# Patient Record
Sex: MALE | Race: WHITE | NOT HISPANIC OR LATINO | Employment: OTHER | URBAN - METROPOLITAN AREA
[De-identification: names, ages, dates, MRNs, and addresses within clinical notes are randomized per-mention and may not be internally consistent; named-entity substitution may affect disease eponyms.]

---

## 2017-03-28 ENCOUNTER — OFFICE VISIT (OUTPATIENT)
Dept: URGENT CARE | Age: 37
End: 2017-03-28
Payer: COMMERCIAL

## 2017-03-28 PROCEDURE — G0382 LEV 3 HOSP TYPE B ED VISIT: HCPCS | Performed by: FAMILY MEDICINE

## 2017-06-05 ENCOUNTER — OFFICE VISIT (OUTPATIENT)
Dept: URGENT CARE | Age: 37
End: 2017-06-05
Payer: MEDICARE

## 2017-06-05 PROCEDURE — G0463 HOSPITAL OUTPT CLINIC VISIT: HCPCS

## 2017-06-05 PROCEDURE — 99213 OFFICE O/P EST LOW 20 MIN: CPT

## 2018-05-29 ENCOUNTER — APPOINTMENT (EMERGENCY)
Dept: RADIOLOGY | Facility: HOSPITAL | Age: 38
End: 2018-05-29
Payer: MEDICARE

## 2018-05-29 ENCOUNTER — HOSPITAL ENCOUNTER (EMERGENCY)
Facility: HOSPITAL | Age: 38
Discharge: HOME | End: 2018-05-29
Attending: EMERGENCY MEDICINE | Admitting: EMERGENCY MEDICINE
Payer: MEDICARE

## 2018-05-29 VITALS
WEIGHT: 165 LBS | HEART RATE: 58 BPM | DIASTOLIC BLOOD PRESSURE: 73 MMHG | RESPIRATION RATE: 15 BRPM | OXYGEN SATURATION: 95 % | TEMPERATURE: 97.2 F | SYSTOLIC BLOOD PRESSURE: 106 MMHG | BODY MASS INDEX: 25.84 KG/M2

## 2018-05-29 DIAGNOSIS — R41.89 UNRESPONSIVE EPISODE: Primary | ICD-10-CM

## 2018-05-29 DIAGNOSIS — F79 MENTAL RETARDATION: ICD-10-CM

## 2018-05-29 LAB
ALBUMIN SERPL BCP-MCNC: 3.4 G/DL (ref 3.5–5)
ALP SERPL-CCNC: 67 U/L (ref 46–116)
ALT SERPL W P-5'-P-CCNC: 17 U/L (ref 12–78)
ANION GAP SERPL CALCULATED.3IONS-SCNC: 2 MMOL/L (ref 4–13)
AST SERPL W P-5'-P-CCNC: 20 U/L (ref 5–45)
ATRIAL RATE: 65 BPM
BASOPHILS # BLD AUTO: 0.01 THOUSANDS/ΜL (ref 0–0.1)
BASOPHILS NFR BLD AUTO: 0 % (ref 0–1)
BILIRUB SERPL-MCNC: 0.31 MG/DL (ref 0.2–1)
BUN SERPL-MCNC: 22 MG/DL (ref 5–25)
CALCIUM SERPL-MCNC: 8.8 MG/DL (ref 8.3–10.1)
CHLORIDE SERPL-SCNC: 106 MMOL/L (ref 100–108)
CO2 SERPL-SCNC: 33 MMOL/L (ref 21–32)
CREAT SERPL-MCNC: 1.01 MG/DL (ref 0.6–1.3)
EOSINOPHIL # BLD AUTO: 0.05 THOUSAND/ΜL (ref 0–0.61)
EOSINOPHIL NFR BLD AUTO: 1 % (ref 0–6)
ERYTHROCYTE [DISTWIDTH] IN BLOOD BY AUTOMATED COUNT: 13.4 % (ref 11.6–15.1)
GFR SERPL CREATININE-BSD FRML MDRD: 95 ML/MIN/1.73SQ M
GLUCOSE SERPL-MCNC: 77 MG/DL (ref 65–140)
HCT VFR BLD AUTO: 44.7 % (ref 36.5–49.3)
HGB BLD-MCNC: 14.1 G/DL (ref 12–17)
LYMPHOCYTES # BLD AUTO: 1.4 THOUSANDS/ΜL (ref 0.6–4.47)
LYMPHOCYTES NFR BLD AUTO: 28 % (ref 14–44)
MCH RBC QN AUTO: 28.1 PG (ref 26.8–34.3)
MCHC RBC AUTO-ENTMCNC: 31.5 G/DL (ref 31.4–37.4)
MCV RBC AUTO: 89 FL (ref 82–98)
MONOCYTES # BLD AUTO: 0.89 THOUSAND/ΜL (ref 0.17–1.22)
MONOCYTES NFR BLD AUTO: 18 % (ref 4–12)
NEUTROPHILS # BLD AUTO: 2.69 THOUSANDS/ΜL (ref 1.85–7.62)
NEUTS SEG NFR BLD AUTO: 53 % (ref 43–75)
NRBC BLD AUTO-RTO: 0 /100 WBCS
P AXIS: 85 DEGREES
PLATELET # BLD AUTO: 116 THOUSANDS/UL (ref 149–390)
PMV BLD AUTO: 10.4 FL (ref 8.9–12.7)
POTASSIUM SERPL-SCNC: 5 MMOL/L (ref 3.5–5.3)
PR INTERVAL: 102 MS
PROT SERPL-MCNC: 6.9 G/DL (ref 6.4–8.2)
QRS AXIS: 82 DEGREES
QRSD INTERVAL: 88 MS
QT INTERVAL: 394 MS
QTC INTERVAL: 409 MS
RBC # BLD AUTO: 5.01 MILLION/UL (ref 3.88–5.62)
SODIUM SERPL-SCNC: 141 MMOL/L (ref 136–145)
T WAVE AXIS: 78 DEGREES
TROPONIN I SERPL-MCNC: <0.02 NG/ML
VENTRICULAR RATE: 65 BPM
WBC # BLD AUTO: 5.05 THOUSAND/UL (ref 4.31–10.16)

## 2018-05-29 PROCEDURE — 84484 ASSAY OF TROPONIN QUANT: CPT | Performed by: EMERGENCY MEDICINE

## 2018-05-29 PROCEDURE — 99285 EMERGENCY DEPT VISIT HI MDM: CPT

## 2018-05-29 PROCEDURE — 93005 ELECTROCARDIOGRAM TRACING: CPT

## 2018-05-29 PROCEDURE — 70450 CT HEAD/BRAIN W/O DYE: CPT

## 2018-05-29 PROCEDURE — 36415 COLL VENOUS BLD VENIPUNCTURE: CPT | Performed by: EMERGENCY MEDICINE

## 2018-05-29 PROCEDURE — 80053 COMPREHEN METABOLIC PANEL: CPT | Performed by: EMERGENCY MEDICINE

## 2018-05-29 PROCEDURE — 85025 COMPLETE CBC W/AUTO DIFF WBC: CPT | Performed by: EMERGENCY MEDICINE

## 2018-05-29 PROCEDURE — 93010 ELECTROCARDIOGRAM REPORT: CPT | Performed by: INTERNAL MEDICINE

## 2018-05-29 RX ORDER — FLUTICASONE PROPIONATE 50 MCG
2 SPRAY, SUSPENSION (ML) NASAL DAILY
COMMUNITY

## 2018-05-29 RX ORDER — DIVALPROEX SODIUM 250 MG/1
750 TABLET, DELAYED RELEASE ORAL
COMMUNITY
End: 2021-11-10

## 2018-05-29 RX ORDER — CLOTRIMAZOLE 1 %
1 CREAM (GRAM) TOPICAL AS NEEDED
COMMUNITY
End: 2021-11-16

## 2018-05-29 RX ORDER — ALBUTEROL SULFATE 2.5 MG/3ML
2.5 SOLUTION RESPIRATORY (INHALATION) AS NEEDED
COMMUNITY
End: 2021-11-16

## 2018-05-29 RX ORDER — DIAPER,BRIEF,INFANT-TODD,DISP
1 EACH MISCELLANEOUS 2 TIMES DAILY
COMMUNITY
End: 2021-11-16

## 2018-05-29 RX ORDER — POLYETHYLENE GLYCOL 3350 17 G/17G
17 POWDER, FOR SOLUTION ORAL DAILY
COMMUNITY
End: 2021-11-16

## 2018-05-29 RX ORDER — DIPHENHYDRAMINE HCL 25 MG
25 TABLET ORAL AS NEEDED
COMMUNITY

## 2018-05-29 RX ORDER — ZINC OXIDE
1 OINTMENT (GRAM) TOPICAL 2 TIMES DAILY PRN
COMMUNITY
End: 2021-11-16

## 2018-05-29 RX ORDER — DIVALPROEX SODIUM 250 MG/1
250 TABLET, DELAYED RELEASE ORAL EVERY MORNING
COMMUNITY
End: 2021-11-10

## 2018-05-29 RX ORDER — BENZTROPINE MESYLATE 1 MG/1
1 TABLET ORAL
COMMUNITY
End: 2021-10-31

## 2018-05-29 RX ORDER — CITALOPRAM 20 MG/1
20 TABLET ORAL DAILY
COMMUNITY
End: 2021-11-16

## 2018-05-29 RX ORDER — SELENIUM SULFIDE 2.5 MG/100ML
1 LOTION TOPICAL DAILY PRN
COMMUNITY
End: 2021-11-16

## 2018-05-29 RX ORDER — LANOLIN ALCOHOL/MO/W.PET/CERES
1 CREAM (GRAM) TOPICAL 2 TIMES DAILY PRN
COMMUNITY
End: 2021-11-16

## 2018-05-29 RX ORDER — IBUPROFEN 400 MG/1
TABLET ORAL AS NEEDED
COMMUNITY
End: 2021-10-31

## 2018-05-29 NOTE — ED NOTES
Dr Mingo Gustafson states they do not need a urine specimen when patient is ambulating to the restroom at this time        Elisa Rosa RN  05/29/18 5418

## 2018-05-29 NOTE — ED PROVIDER NOTES
History  Chief Complaint   Patient presents with    Altered Mental Status     Patient presents to the E R  being asymptomatic  Patient was attending a day program and was reportedly "not as attentive and active as he normally is "       55-year-old male with past medical history of mental retardation in various psychiatric diagnoses who is presenting with a transient alteration in his level of awareness  Patient cannot provide history due to mental retardation; history is provided by staff members from the day program   They report the patient was slightly more tired than he normally is this morning but otherwise seemed to be at baseline  When patient was walking with a staff member, he went to in office and sat down  This is apparently unusual because the patient "never sits " per staff member  Patient apparently was not responding to questions as he normally does  He also appeared pale during this time  This episode lasted for 5 minutes before resolving without intervention  At this time, patient is asymptomatic  He is at his baseline per staff members  Apparently, patient has had diarrhea for several weeks  He has also lost 15 lb in the past 6 months  He has seen his PCP for this problem  Per Care Everywhere records, his PCP ordered labs  Of note, patient is on MiraLax  It is unclear if he is still taking this  Review of systems otherwise unobtainable due to patient's mental retardation  Assessment and plan:  Transient alteration in awareness in a 55-year-old male  Differential considerations include syncope with seizure less likely  We will obtain CBC, CMP, troponin, EKG, and CT head  Prior to Admission Medications   Prescriptions Last Dose Informant Patient Reported? Taking?    Albuterol Sulfate (VENTOLIN HFA IN)   Yes Yes   Sig: Inhale 1 puff as needed   BENZONATATE PO   Yes Yes   Sig: Take 100 mg by mouth as needed   Dextromethorphan-Guaifenesin (ROBITUSSIN DM PO)   Yes Yes   Sig: Take 1 Dose by mouth as needed   Emollient (EUCERIN) lotion   Yes Yes   Sig: Apply 1 application topically 2 (two) times a day as needed for dry skin   NON FORMULARY   Yes Yes   Sig: Bag balm PRN dry skin   albuterol (2 5 mg/3 mL) 0 083 % nebulizer solution   Yes Yes   Sig: Take 2 5 mg by nebulization as needed for wheezing   benztropine (COGENTIN) 1 mg tablet   Yes Yes   Sig: Take 1 mg by mouth daily at bedtime   citalopram (CeleXA) 20 mg tablet   Yes Yes   Sig: Take 20 mg by mouth daily   clotrimazole (LOTRIMIN) 1 % cream   Yes Yes   Sig: Apply 1 application topically as needed   diphenhydrAMINE (BENADRYL) 25 mg tablet   Yes Yes   Sig: Take 25 mg by mouth as needed for itching   divalproex sodium (DEPAKOTE) 250 mg EC tablet   Yes Yes   Sig: Take 250 mg by mouth every morning   divalproex sodium (DEPAKOTE) 250 mg EC tablet   Yes Yes   Sig: Take 750 mg by mouth daily at bedtime   fluticasone (FLONASE) 50 mcg/act nasal spray   Yes Yes   Si sprays into each nostril daily   hydrocortisone 1 % cream   Yes Yes   Sig: Apply 1 application topically 2 (two) times a day   ibuprofen (MOTRIN) 400 mg tablet   Yes Yes   Sig: Take by mouth as needed for mild pain   liver oil-zinc oxide (DESITIN) 40 % ointment   Yes Yes   Sig: Apply 1 application topically 2 (two) times a day as needed for irritation   miconazole 2 % cream   Yes Yes   Sig: Apply 1 application topically 2 (two) times a day   polyethylene glycol (MIRALAX) 17 g packet   Yes Yes   Sig: Take 17 g by mouth daily   selenium sulfide (SELSUN) 2 5 % shampoo   Yes Yes   Sig: Apply 1 application topically daily as needed for dandruff      Facility-Administered Medications: None       Past Medical History:   Diagnosis Date    ADHD     Depression     Depression     Dysphagia     Incontinence     Intermittent explosive disorder     Mood disorder (HCC)     MR (mental retardation)     MR (mental retardation), moderate     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     Oppositional defiant disorder     Oppositional defiant disorder     Psychiatric disorder     Seasonal allergies     Thoracic scoliosis        History reviewed  No pertinent surgical history  History reviewed  No pertinent family history  I have reviewed and agree with the history as documented  Social History   Substance Use Topics    Smoking status: Never Smoker    Smokeless tobacco: Never Used    Alcohol use No        Review of Systems   Unable to perform ROS: Other (mental retardation)       Physical Exam  ED Triage Vitals   Temperature Pulse Respirations Blood Pressure SpO2   05/29/18 1043 05/29/18 1043 05/29/18 1043 05/29/18 1043 05/29/18 1043   (!) 97 2 °F (36 2 °C) 66 18 112/63 98 %      Temp Source Heart Rate Source Patient Position - Orthostatic VS BP Location FiO2 (%)   05/29/18 1043 05/29/18 1043 05/29/18 1043 05/29/18 1043 --   Tympanic Monitor Lying Right arm       Pain Score       05/29/18 1300       No Pain           Orthostatic Vital Signs  Vitals:    05/29/18 1043 05/29/18 1300   BP: 112/63 106/73   Pulse: 66 58   Patient Position - Orthostatic VS: Lying Lying       Physical Exam   Constitutional: He appears well-developed and well-nourished  HENT:   Head: Normocephalic and atraumatic  Right Ear: External ear normal    Left Ear: External ear normal    Nose: Nose normal    Eyes: EOM are normal  Pupils are equal, round, and reactive to light  Neck: Normal range of motion  Neck supple  Cardiovascular: Normal rate, regular rhythm and normal heart sounds  No murmur heard  Pulmonary/Chest: Effort normal and breath sounds normal  No respiratory distress  He has no wheezes  He has no rales  Abdominal: Soft  Bowel sounds are normal  He exhibits no distension  There is no tenderness  There is no guarding  Musculoskeletal: Normal range of motion  He exhibits no deformity  Neurological: He is alert  Patient could not comply with full neurological examination    He did answer basic questions appropriately  Cranial nerves were grossly intact  Patient had 5/5  strength and 5/5 lower extremity strength bilaterally  Skin: Skin is warm and dry  Capillary refill takes less than 2 seconds  He is not diaphoretic  Psychiatric:   Patient is smiling and following commands appropriately  Nursing note and vitals reviewed  ED Medications  Medications - No data to display    Diagnostic Studies  Results Reviewed     Procedure Component Value Units Date/Time    Comprehensive metabolic panel [[id number]]  (Abnormal) Collected:  05/29/18 1121    Lab Status:  Final result Specimen:  Blood from Arm, Right Updated:  05/29/18 1152     Sodium 141 mmol/L      Potassium 5 0 mmol/L      Chloride 106 mmol/L      CO2 33 (H) mmol/L      Anion Gap 2 (L) mmol/L      BUN 22 mg/dL      Creatinine 1 01 mg/dL      Glucose 77 mg/dL      Calcium 8 8 mg/dL      AST 20 U/L      ALT 17 U/L      Alkaline Phosphatase 67 U/L      Total Protein 6 9 g/dL      Albumin 3 4 (L) g/dL      Total Bilirubin 0 31 mg/dL      eGFR 95 ml/min/1 73sq m     Narrative:         National Kidney Disease Education Program recommendations are as follows:  GFR calculation is accurate only with a steady state creatinine  Chronic Kidney disease less than 60 ml/min/1 73 sq  meters  Kidney failure less than 15 ml/min/1 73 sq  meters  Troponin I [[id number]]  (Normal) Collected:  05/29/18 1121    Lab Status:  Final result Specimen:  Blood from Arm, Right Updated:  05/29/18 1152     Troponin I <0 02 ng/mL     Narrative:         [employer] Chemistry analyzer 99% cutoff is > 0 04 ng/mL in network labs    o cTnI 99% cutoff is useful only when applied to patients in the clinical setting of myocardial ischemia  o cTnI 99% cutoff should be interpreted in the context of clinical history, ECG findings and possibly cardiac imaging to establish correct diagnosis    o cTnI 99% cutoff may be suggestive but clearly not indicative of a coronary event without the clinical setting of myocardial ischemia  CBC and differential [[id number]]  (Abnormal) Collected:  05/29/18 1121    Lab Status:  Final result Specimen:  Blood from Arm, Right Updated:  05/29/18 1133     WBC 5 05 Thousand/uL      RBC 5 01 Million/uL      Hemoglobin 14 1 g/dL      Hematocrit 44 7 %      MCV 89 fL      MCH 28 1 pg      MCHC 31 5 g/dL      RDW 13 4 %      MPV 10 4 fL      Platelets 547 (L) Thousands/uL      nRBC 0 /100 WBCs      Neutrophils Relative 53 %      Lymphocytes Relative 28 %      Monocytes Relative 18 (H) %      Eosinophils Relative 1 %      Basophils Relative 0 %      Neutrophils Absolute 2 69 Thousands/µL      Lymphocytes Absolute 1 40 Thousands/µL      Monocytes Absolute 0 89 Thousand/µL      Eosinophils Absolute 0 05 Thousand/µL      Basophils Absolute 0 01 Thousands/µL                  CT head without contrast   Final Result by Lisa Bangura MD (05/29 1253)      No acute intracranial abnormality  Extensive diffuse white matter hypodensity with associated cerebral and cerebellar atrophy and premature basal ganglia calcification  Findings are nonspecific but could be related to chronic demyelinating disease, in utero insult, or developmental    abnormality  Correlation with the patient's neurologic history and any prior MRI evaluations is recommended  If there has been no prior MRI evaluation, nonemergent follow-up MRI would likely be useful        Workstation performed: [id number]               Procedures  ECG 12 Lead Documentation  Date/Time: 5/29/2018 11:43 AM  Performed by: Jesu Rowan by: Alan Aguilar     ECG reviewed by me, the ED Provider: yes    Patient location:  ED  Previous ECG:     Previous ECG:  Unavailable    Comparison to cardiac monitor: Yes    Interpretation:     Interpretation: normal    Rate:     ECG rate:  65    ECG rate assessment: normal    Rhythm:     Rhythm: sinus rhythm    Ectopy:     Ectopy: none    QRS:     QRS axis:  Normal    QRS intervals:  Normal  Conduction:     Conduction: normal    ST segments:     ST segments:  Normal  T waves:     T waves: normal            Phone Consults  ED Phone Contact    ED Course  ED Course as of May 29 1801   Tue May 29, 2018   1058 Blood Pressure: 112/63   1058 Temperature: (!) 97 2 °F (36 2 °C)   1058 Pulse: 66   1058 Respirations: 18   1058 SpO2: 98 %   1149 CBC unremarkable  1155 Troponin I: <0 02   1155 CMP unremarkable  1304 Extensive diffuse white matter hypodensity with associated cerebral and cerebellar atrophy and premature basal ganglia calcification   Findings are nonspecific but could be related to chronic demyelinating disease, in utero insult, or developmental   abnormality  CT head without contrast                               MDM  Number of Diagnoses or Management Options  Mental retardation: established and worsening  Unresponsive episode: new and requires workup  Diagnosis management comments:     43-year-old male presenting with an episode of transient alteration in level of consciousness/ awareness  Patient apparently did not respond to questions normally during this episode  On ED arrival, he was at baseline  Physical examination was unremarkable  Neurological examination was normal and nonfocal   We were concerned for syncope or seizure  We obtained labs as above; all were normal   CT head demonstrated changes consistent with patient's known history of mental retardation but did not demonstrate any acute pathology which would explain the patient's symptoms  Patient was observed in the ED and remained asymptomatic  He was discharged back to his  facility with strict return precautions that if another episode were to occur, he should return to the nearest ED immediately  Final diagnosis:  Unclear, may be presyncope, syncope, less likely absence seizure         Amount and/or Complexity of Data Reviewed  Clinical lab tests: ordered and reviewed  Tests in the radiology section of CPT®: ordered and reviewed  Decide to obtain previous medical records or to obtain history from someone other than the patient: yes  Obtain history from someone other than the patient: yes  Review and summarize past medical records: yes  Independent visualization of images, tracings, or specimens: yes    Risk of Complications, Morbidity, and/or Mortality  Presenting problems: low  Diagnostic procedures: minimal  Management options: minimal    Patient Progress  Patient progress: improved    CritCare Time    Disposition  Final diagnoses:   Unresponsive episode   Mental retardation     Time reflects when diagnosis was documented in both MDM as applicable and the Disposition within this note     Time User Action Codes Description Comment    5/29/2018  1:05 PM Vanessa Lanza Add [R41 89] Unresponsive episode     5/29/2018  1:06 PM Vanessa Cabello [F79] Mental retardation       ED Disposition     ED Disposition Condition Comment    Discharge  Ana Nair MEIR [facility] discharge to home/self care  Condition at discharge: Good        Follow-up Information     Follow up With Specialties Details Why [address] [facility] Call As needed   [place] [id number]  [phone]            Discharge Medication List as of 5/29/2018  1:09 PM      CONTINUE these medications which have NOT CHANGED    Details   albuterol (2 5 mg/3 mL) 0 083 % nebulizer solution Take 2 5 mg by nebulization as needed for wheezing, Historical Med      Albuterol Sulfate (VENTOLIN HFA IN) Inhale 1 puff as needed, Historical Med      BENZONATATE PO Take 100 mg by mouth as needed, Historical Med      benztropine (COGENTIN) 1 mg tablet Take 1 mg by mouth daily at bedtime, Historical Med      citalopram (CeleXA) 20 mg tablet Take 20 mg by mouth daily, Historical Med      clotrimazole (LOTRIMIN) 1 % cream Apply 1 application topically as needed, Historical Med Dextromethorphan-Guaifenesin (ROBITUSSIN DM PO) Take 1 Dose by mouth as needed, Historical Med      diphenhydrAMINE (BENADRYL) 25 mg tablet Take 25 mg by mouth as needed for itching, Historical Med      !! divalproex sodium (DEPAKOTE) 250 mg EC tablet Take 250 mg by mouth every morning, Historical Med      !! divalproex sodium (DEPAKOTE) 250 mg EC tablet Take 750 mg by mouth daily at bedtime, Historical Med      Emollient (EUCERIN) lotion Apply 1 application topically 2 (two) times a day as needed for dry skin, Historical Med      fluticasone (FLONASE) 50 mcg/act nasal spray 2 sprays into each nostril daily, Historical Med      hydrocortisone 1 % cream Apply 1 application topically 2 (two) times a day, Historical Med      ibuprofen (MOTRIN) 400 mg tablet Take by mouth as needed for mild pain, Historical Med      liver oil-zinc oxide (DESITIN) 40 % ointment Apply 1 application topically 2 (two) times a day as needed for irritation, Historical Med      miconazole 2 % cream Apply 1 application topically 2 (two) times a day, Historical Med      NON FORMULARY Bag balm PRN dry skin, Historical Med      polyethylene glycol (MIRALAX) 17 g packet Take 17 g by mouth daily, Historical Med      selenium sulfide (SELSUN) 2 5 % shampoo Apply 1 application topically daily as needed for dandruff, Historical Med       !! - Potential duplicate medications found  Please discuss with provider  No discharge procedures on file  ED Provider  Attending physically available and evaluated Marcelino Ramirez  I managed the patient along with the ED Attending      Electronically Signed by         Hola Kenny MD  05/29/18 9869

## 2018-05-29 NOTE — ED ATTENDING ATTESTATION
Evelina Michelle MD, saw and evaluated the patient  I have discussed the patient with the resident/non-physician practitioner and agree with the resident's/non-physician practitioner's findings, Plan of Care, and MDM as documented in the resident's/non-physician practitioner's note, except where noted  All available labs and Radiology studies were reviewed  At this point I agree with the current assessment done in the Emergency Department  I have conducted an independent evaluation of this patient a history and physical is as follows:      Critical Care Time  CritCare Time    Procedures     41 yo male with hx of mr, odd, depression, brought in by  staff for increased lethargy this morning and then while walking today found to be unresponsive and noncommunicative  Pt behavior was different than baseline  Episode lasted 5 minutes  Pt currently back to normal   No hx of seizures  Pt with diarrhea recently with weight loss  vss, afebrile, lungs cta, rrr, abdomen soft nontender, no neuro deficits noted  Cardiac workup, urine

## 2018-05-29 NOTE — DISCHARGE INSTRUCTIONS
Orville Pedroza was evaluated today for a transient unresponsive episode  Lab work was normal   EKG was also normal   CT head showed changes consistent with patient's known history of intellectual disability  If these episodes recur, please return to the nearest emergency department immediately for further evaluation  If any other concerning symptoms occur, please return to the nearest emergency department immediately  Also, please stop giving MiraLax daily  Patient has been having frequent diarrhea and does not require MiraLax every day  Please only give it as needed

## 2021-04-09 ENCOUNTER — HOSPITAL ENCOUNTER (EMERGENCY)
Facility: HOSPITAL | Age: 41
Discharge: HOME | End: 2021-04-09
Attending: EMERGENCY MEDICINE
Payer: COMMERCIAL

## 2021-04-09 VITALS
RESPIRATION RATE: 16 BRPM | BODY MASS INDEX: 19.34 KG/M2 | DIASTOLIC BLOOD PRESSURE: 85 MMHG | WEIGHT: 123.46 LBS | SYSTOLIC BLOOD PRESSURE: 113 MMHG | TEMPERATURE: 97.8 F | HEART RATE: 76 BPM | OXYGEN SATURATION: 98 %

## 2021-04-09 DIAGNOSIS — Z13.9 ENCOUNTER FOR MEDICAL SCREENING EXAMINATION: ICD-10-CM
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
## 2021-04-09 PROCEDURE — 99281 EMR DPT VST MAYX REQ PHY/QHP: CPT | Performed by: EMERGENCY MEDICINE

## 2021-04-09 PROCEDURE — 99283 EMERGENCY DEPT VISIT LOW MDM: CPT
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
## 2021-06-01 ENCOUNTER — HOSPITAL ENCOUNTER (EMERGENCY)
Facility: HOSPITAL | Age: 41
Discharge: HOME | End: 2021-06-01
Attending: EMERGENCY MEDICINE | Admitting: EMERGENCY MEDICINE
Payer: COMMERCIAL

## 2021-06-01 VITALS
TEMPERATURE: 97 F | HEART RATE: 92 BPM | RESPIRATION RATE: 16 BRPM | DIASTOLIC BLOOD PRESSURE: 79 MMHG | WEIGHT: 126.76 LBS | BODY MASS INDEX: 19.85 KG/M2 | SYSTOLIC BLOOD PRESSURE: 127 MMHG
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
## 2021-06-01 PROCEDURE — 99281 EMR DPT VST MAYX REQ PHY/QHP: CPT | Performed by: PHYSICIAN ASSISTANT

## 2021-06-01 PROCEDURE — 99283 EMERGENCY DEPT VISIT LOW MDM: CPT
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
## 2021-09-09 ENCOUNTER — OFFICE VISIT (OUTPATIENT)
Dept: AUDIOLOGY | Age: 41
End: 2021-09-09
Payer: MEDICARE

## 2021-09-09 DIAGNOSIS — H90.5 SENSORY HEARING LOSS, UNILATERAL: Primary | ICD-10-CM

## 2021-09-09 DIAGNOSIS — F70 MILD INTELLECTUAL DISABILITIES: ICD-10-CM

## 2021-09-09 PROCEDURE — 92582 CONDITIONING PLAY AUDIOMETRY: CPT | Performed by: AUDIOLOGIST

## 2021-09-09 PROCEDURE — 92567 TYMPANOMETRY: CPT | Performed by: AUDIOLOGIST

## 2021-09-09 PROCEDURE — 92555 SPEECH THRESHOLD AUDIOMETRY: CPT | Performed by: AUDIOLOGIST

## 2021-09-09 NOTE — PROGRESS NOTES
HEARING EVALUATION    Name:  Neeraj Chowdary  :  1980  Age:  39 y o  Date of Evaluation: 21     History: Annual Hearing Test  Reason for visit: Neeraj Chowdary is being seen today at the request of Dr Marisol Campuzano for an evaluation of hearing and was in the accompaniment of his caregiver/nurse Radha Gilliam  Caregiver reports no real concerns in regards to Maryana Blair hearing as he does respond to sounds and speech in his home environment  Caregiver does admit there are times she and other staff feel Pamella Welch does have selective hearing  Pamella Welch was previously being followed on an annual basis by Dr Isaias Chiang office; recently retired  Caregiver believes during his time with Dr Isaias Chiang office she feels there may have been some hearing loss found but is unsure the amount  Caregiver denies any recent ear infections for Pamella Welch  EVALUATION:    Otoscopic Evaluation:   Right Ear: Clear and healthy ear canal and tympanic membrane   Left Ear: Clear and healthy ear canal and tympanic membrane    Tympanometry:   Right: Type A - normal middle ear pressure and compliance   Left: Type A - normal middle ear pressure and compliance    Distortion Product Otoacoustic Emissions:   Right: Absent  Consistent with abnormal cochlear function with hearing loss equal to or greater than a moderate hearing loss and Refer   Left: Absent  Consistent with abnormal cochlear function with hearing loss equal to or greater than a moderate hearing loss and Refer     Audiogram Results:  TEST METHOD: Conditioned Play Audiometry (CPA) and Visual Reinforcement Audiometry (VRA) utilizing inserts in the sound waldrop setting; two clinicians were utilized for todays testing  RELIABILITY: Good for speech; Fair for tones  SPEECH RESULTS: Speech Recognition Thresholds (SRT) was obtained via spondee picture ID cards at 25 dB HL in the right ear and 25 dB HL in the left ear  Did not test below 25 dB to avoid fatigue       TONAL RESULTS: Tonal results were unable to be obtained  Zhanna Driver is still emerging in the play task  Attempted VRA for tonal information and Zhanna Driver fatigued quickly  *see attached audiogram      RECOMMENDATIONS:  Annual hearing eval and Copy to Patient/Caregiver    PATIENT EDUCATION:   Discussed results and recommendations with Zhanna Driver and his caregiver  Recommended Zhanna Driver return on an annual basis; sooner if issues/concerns arise  Questions were addressed and Zhanna Driver and is caregiver were encouraged to contact our department should concerns arise        Casie Post , CCC-A  Clinical Audiologist

## 2021-10-31 PROBLEM — S72.144D CLOSED NONDISPLACED INTERTROCHANTERIC FRACTURE OF RIGHT FEMUR WITH ROUTINE HEALING: Status: ACTIVE | Noted: 2021-10-31

## 2021-11-01 ENCOUNTER — NURSING HOME VISIT (OUTPATIENT)
Dept: GERIATRICS | Facility: OTHER | Age: 41
End: 2021-11-01
Payer: MEDICARE

## 2021-11-01 VITALS
RESPIRATION RATE: 18 BRPM | HEART RATE: 79 BPM | SYSTOLIC BLOOD PRESSURE: 110 MMHG | BODY MASS INDEX: 20.52 KG/M2 | TEMPERATURE: 98 F | WEIGHT: 131 LBS | OXYGEN SATURATION: 93 % | DIASTOLIC BLOOD PRESSURE: 70 MMHG

## 2021-11-01 DIAGNOSIS — S72.144D CLOSED NONDISPLACED INTERTROCHANTERIC FRACTURE OF RIGHT FEMUR WITH ROUTINE HEALING: Primary | ICD-10-CM

## 2021-11-01 PROCEDURE — 99305 1ST NF CARE MODERATE MDM 35: CPT | Performed by: INTERNAL MEDICINE

## 2021-11-03 ENCOUNTER — NURSING HOME VISIT (OUTPATIENT)
Dept: GERIATRICS | Facility: OTHER | Age: 41
End: 2021-11-03
Payer: MEDICARE

## 2021-11-03 VITALS
SYSTOLIC BLOOD PRESSURE: 114 MMHG | TEMPERATURE: 97.4 F | OXYGEN SATURATION: 98 % | HEART RATE: 87 BPM | RESPIRATION RATE: 18 BRPM | DIASTOLIC BLOOD PRESSURE: 74 MMHG

## 2021-11-03 DIAGNOSIS — F31.9 BIPOLAR AFFECTIVE DISORDER, REMISSION STATUS UNSPECIFIED (HCC): ICD-10-CM

## 2021-11-03 DIAGNOSIS — H40.9 GLAUCOMA, UNSPECIFIED GLAUCOMA TYPE, UNSPECIFIED LATERALITY: ICD-10-CM

## 2021-11-03 DIAGNOSIS — R13.10 DYSPHAGIA, UNSPECIFIED TYPE: ICD-10-CM

## 2021-11-03 DIAGNOSIS — R26.2 AMBULATORY DYSFUNCTION: ICD-10-CM
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
## 2021-11-03 DIAGNOSIS — S72.144D CLOSED NONDISPLACED INTERTROCHANTERIC FRACTURE OF RIGHT FEMUR WITH ROUTINE HEALING: Primary | ICD-10-CM

## 2021-11-03 PROBLEM — R29.6 FALLS: Status: ACTIVE | Noted: 2021-11-03
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
## 2021-11-03 PROCEDURE — 99309 SBSQ NF CARE MODERATE MDM 30: CPT | Performed by: NURSE PRACTITIONER

## 2021-11-05 ENCOUNTER — NURSING HOME VISIT (OUTPATIENT)
Dept: GERIATRICS | Facility: OTHER | Age: 41
End: 2021-11-05
Payer: MEDICARE

## 2021-11-05 VITALS
HEART RATE: 82 BPM | OXYGEN SATURATION: 98 % | TEMPERATURE: 97.4 F | DIASTOLIC BLOOD PRESSURE: 74 MMHG | SYSTOLIC BLOOD PRESSURE: 116 MMHG | RESPIRATION RATE: 18 BRPM

## 2021-11-05 DIAGNOSIS — H40.9 GLAUCOMA, UNSPECIFIED GLAUCOMA TYPE, UNSPECIFIED LATERALITY: ICD-10-CM

## 2021-11-05 DIAGNOSIS — R26.2 AMBULATORY DYSFUNCTION: ICD-10-CM
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
## 2021-11-05 DIAGNOSIS — R13.10 DYSPHAGIA, UNSPECIFIED TYPE: Primary | ICD-10-CM

## 2021-11-05 DIAGNOSIS — S72.144D CLOSED NONDISPLACED INTERTROCHANTERIC FRACTURE OF RIGHT FEMUR WITH ROUTINE HEALING: ICD-10-CM

## 2021-11-05 DIAGNOSIS — F31.9 BIPOLAR AFFECTIVE DISORDER, REMISSION STATUS UNSPECIFIED (HCC): ICD-10-CM

## 2021-11-05 PROCEDURE — 99309 SBSQ NF CARE MODERATE MDM 30: CPT | Performed by: NURSE PRACTITIONER

## 2021-11-10 ENCOUNTER — NURSING HOME VISIT (OUTPATIENT)
Dept: GERIATRICS | Facility: OTHER | Age: 41
End: 2021-11-10
Payer: MEDICARE

## 2021-11-10 VITALS
SYSTOLIC BLOOD PRESSURE: 123 MMHG | DIASTOLIC BLOOD PRESSURE: 65 MMHG | OXYGEN SATURATION: 98 % | HEART RATE: 68 BPM | RESPIRATION RATE: 18 BRPM | TEMPERATURE: 97.3 F

## 2021-11-10 DIAGNOSIS — H40.9 GLAUCOMA, UNSPECIFIED GLAUCOMA TYPE, UNSPECIFIED LATERALITY: ICD-10-CM

## 2021-11-10 DIAGNOSIS — R13.10 DYSPHAGIA, UNSPECIFIED TYPE: ICD-10-CM

## 2021-11-10 DIAGNOSIS — R26.2 AMBULATORY DYSFUNCTION: ICD-10-CM

## 2021-11-10 DIAGNOSIS — F31.9 BIPOLAR AFFECTIVE DISORDER, REMISSION STATUS UNSPECIFIED (HCC): ICD-10-CM
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
## 2021-11-10 DIAGNOSIS — S72.144D CLOSED NONDISPLACED INTERTROCHANTERIC FRACTURE OF RIGHT FEMUR WITH ROUTINE HEALING: Primary | ICD-10-CM

## 2021-11-10 PROCEDURE — 99309 SBSQ NF CARE MODERATE MDM 30: CPT | Performed by: NURSE PRACTITIONER

## 2021-11-10 RX ORDER — DIVALPROEX SODIUM 125 MG/1
125 TABLET, DELAYED RELEASE ORAL EVERY 8 HOURS SCHEDULED
COMMUNITY
End: 2022-05-27

## 2021-11-12 ENCOUNTER — NURSING HOME VISIT (OUTPATIENT)
Dept: GERIATRICS | Facility: OTHER | Age: 41
End: 2021-11-12
Payer: MEDICARE

## 2021-11-12 VITALS
TEMPERATURE: 97.5 F | DIASTOLIC BLOOD PRESSURE: 62 MMHG | OXYGEN SATURATION: 98 % | HEART RATE: 70 BPM | SYSTOLIC BLOOD PRESSURE: 120 MMHG | RESPIRATION RATE: 18 BRPM

## 2021-11-12 DIAGNOSIS — S72.144D CLOSED NONDISPLACED INTERTROCHANTERIC FRACTURE OF RIGHT FEMUR WITH ROUTINE HEALING: Primary | ICD-10-CM

## 2021-11-12 DIAGNOSIS — R13.10 DYSPHAGIA, UNSPECIFIED TYPE: ICD-10-CM
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
## 2021-11-12 DIAGNOSIS — H40.9 GLAUCOMA, UNSPECIFIED GLAUCOMA TYPE, UNSPECIFIED LATERALITY: ICD-10-CM

## 2021-11-12 DIAGNOSIS — F31.9 BIPOLAR AFFECTIVE DISORDER, REMISSION STATUS UNSPECIFIED (HCC): ICD-10-CM

## 2021-11-12 DIAGNOSIS — R26.2 AMBULATORY DYSFUNCTION: ICD-10-CM

## 2021-11-12 PROCEDURE — 99309 SBSQ NF CARE MODERATE MDM 30: CPT | Performed by: NURSE PRACTITIONER

## 2021-11-16 ENCOUNTER — NURSING HOME VISIT (OUTPATIENT)
Dept: GERIATRICS | Facility: OTHER | Age: 41
End: 2021-11-16
Payer: MEDICARE

## 2021-11-16 VITALS
HEART RATE: 89 BPM | DIASTOLIC BLOOD PRESSURE: 79 MMHG | TEMPERATURE: 98.1 F | RESPIRATION RATE: 18 BRPM | OXYGEN SATURATION: 98 % | SYSTOLIC BLOOD PRESSURE: 109 MMHG

## 2021-11-16 DIAGNOSIS — R26.2 AMBULATORY DYSFUNCTION: ICD-10-CM

## 2021-11-16 DIAGNOSIS — F31.9 BIPOLAR AFFECTIVE DISORDER, REMISSION STATUS UNSPECIFIED (HCC): ICD-10-CM
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
## 2021-11-16 DIAGNOSIS — H40.9 GLAUCOMA, UNSPECIFIED GLAUCOMA TYPE, UNSPECIFIED LATERALITY: ICD-10-CM

## 2021-11-16 DIAGNOSIS — S72.144D CLOSED NONDISPLACED INTERTROCHANTERIC FRACTURE OF RIGHT FEMUR WITH ROUTINE HEALING: ICD-10-CM

## 2021-11-16 DIAGNOSIS — R13.10 DYSPHAGIA, UNSPECIFIED TYPE: Primary | ICD-10-CM

## 2021-11-16 PROCEDURE — 99309 SBSQ NF CARE MODERATE MDM 30: CPT | Performed by: NURSE PRACTITIONER

## 2021-11-16 RX ORDER — BALSAM PERU/CASTOR OIL
1 OINTMENT (GRAM) TOPICAL 2 TIMES DAILY
COMMUNITY

## 2021-11-16 RX ORDER — SENNA PLUS 8.6 MG/1
1 TABLET ORAL 2 TIMES DAILY
COMMUNITY
End: 2022-05-27

## 2021-11-16 RX ORDER — SELENIUM SULFIDE 2.5 MG/100ML
1 LOTION TOPICAL AS NEEDED
COMMUNITY

## 2021-11-16 RX ORDER — LIDOCAINE 4 G/G
1 PATCH TOPICAL DAILY
COMMUNITY

## 2021-11-16 RX ORDER — ACETAMINOPHEN 500 MG
500 TABLET ORAL EVERY 6 HOURS PRN
COMMUNITY

## 2021-11-16 RX ORDER — BRINZOLAMIDE 10 MG/ML
1 SUSPENSION/ DROPS OPHTHALMIC 3 TIMES DAILY
COMMUNITY
End: 2022-05-27

## 2021-11-16 RX ORDER — CITALOPRAM 10 MG/1
10 TABLET ORAL DAILY
COMMUNITY

## 2021-11-16 RX ORDER — BRIMONIDINE TARTRATE, TIMOLOL MALEATE 2; 5 MG/ML; MG/ML
1 SOLUTION/ DROPS OPHTHALMIC EVERY 12 HOURS SCHEDULED
COMMUNITY

## 2021-11-16 RX ORDER — ACETAMINOPHEN 325 MG/1
650 TABLET ORAL EVERY 6 HOURS PRN
COMMUNITY
End: 2021-11-16

## 2021-11-18 ENCOUNTER — NURSING HOME VISIT (OUTPATIENT)
Dept: GERIATRICS | Facility: OTHER | Age: 41
End: 2021-11-18
Payer: MEDICARE

## 2021-11-18 VITALS
SYSTOLIC BLOOD PRESSURE: 109 MMHG | RESPIRATION RATE: 18 BRPM | HEART RATE: 85 BPM | OXYGEN SATURATION: 98 % | DIASTOLIC BLOOD PRESSURE: 75 MMHG | TEMPERATURE: 97.8 F

## 2021-11-18 DIAGNOSIS — R26.2 AMBULATORY DYSFUNCTION: ICD-10-CM

## 2021-11-18 DIAGNOSIS — H40.9 GLAUCOMA, UNSPECIFIED GLAUCOMA TYPE, UNSPECIFIED LATERALITY: ICD-10-CM

## 2021-11-18 DIAGNOSIS — S72.144D CLOSED NONDISPLACED INTERTROCHANTERIC FRACTURE OF RIGHT FEMUR WITH ROUTINE HEALING: ICD-10-CM

## 2021-11-18 DIAGNOSIS — F31.9 BIPOLAR AFFECTIVE DISORDER, REMISSION STATUS UNSPECIFIED (HCC): ICD-10-CM

## 2021-11-18 DIAGNOSIS — R13.10 DYSPHAGIA, UNSPECIFIED TYPE: Primary | ICD-10-CM
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
## 2021-11-18 PROCEDURE — 99309 SBSQ NF CARE MODERATE MDM 30: CPT | Performed by: NURSE PRACTITIONER

## 2021-11-22 ENCOUNTER — NURSING HOME VISIT (OUTPATIENT)
Dept: GERIATRICS | Facility: OTHER | Age: 41
End: 2021-11-22
Payer: MEDICARE

## 2021-11-22 VITALS
OXYGEN SATURATION: 98 % | RESPIRATION RATE: 18 BRPM | TEMPERATURE: 97.5 F | SYSTOLIC BLOOD PRESSURE: 110 MMHG | DIASTOLIC BLOOD PRESSURE: 62 MMHG | HEART RATE: 100 BPM

## 2021-11-22 DIAGNOSIS — S72.144D CLOSED NONDISPLACED INTERTROCHANTERIC FRACTURE OF RIGHT FEMUR WITH ROUTINE HEALING: ICD-10-CM

## 2021-11-22 DIAGNOSIS — H40.9 GLAUCOMA, UNSPECIFIED GLAUCOMA TYPE, UNSPECIFIED LATERALITY: ICD-10-CM

## 2021-11-22 DIAGNOSIS — J02.9 SORE THROAT: ICD-10-CM

## 2021-11-22 DIAGNOSIS — R13.10 DYSPHAGIA, UNSPECIFIED TYPE: Primary | ICD-10-CM
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
## 2021-11-22 DIAGNOSIS — R26.2 AMBULATORY DYSFUNCTION: ICD-10-CM

## 2021-11-22 DIAGNOSIS — F31.9 BIPOLAR AFFECTIVE DISORDER, REMISSION STATUS UNSPECIFIED (HCC): ICD-10-CM

## 2021-11-22 PROCEDURE — 99309 SBSQ NF CARE MODERATE MDM 30: CPT | Performed by: NURSE PRACTITIONER

## 2021-11-24 ENCOUNTER — NURSING HOME VISIT (OUTPATIENT)
Dept: GERIATRICS | Facility: OTHER | Age: 41
End: 2021-11-24
Payer: MEDICARE

## 2021-11-24 DIAGNOSIS — J02.9 ACUTE SORE THROAT: Primary | ICD-10-CM

## 2021-11-24 PROCEDURE — 99307 SBSQ NF CARE SF MDM 10: CPT | Performed by: NURSE PRACTITIONER

## 2021-11-26 ENCOUNTER — NURSING HOME VISIT (OUTPATIENT)
Dept: GERIATRICS | Facility: OTHER | Age: 41
End: 2021-11-26
Payer: MEDICARE

## 2021-11-26 VITALS
DIASTOLIC BLOOD PRESSURE: 72 MMHG | TEMPERATURE: 98.1 F | SYSTOLIC BLOOD PRESSURE: 126 MMHG | HEART RATE: 87 BPM | OXYGEN SATURATION: 96 % | RESPIRATION RATE: 18 BRPM
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
## 2021-11-26 DIAGNOSIS — R26.2 AMBULATORY DYSFUNCTION: ICD-10-CM

## 2021-11-26 DIAGNOSIS — J02.9 ACUTE SORE THROAT: ICD-10-CM

## 2021-11-26 DIAGNOSIS — S72.144D CLOSED NONDISPLACED INTERTROCHANTERIC FRACTURE OF RIGHT FEMUR WITH ROUTINE HEALING: ICD-10-CM

## 2021-11-26 DIAGNOSIS — H40.9 GLAUCOMA, UNSPECIFIED GLAUCOMA TYPE, UNSPECIFIED LATERALITY: ICD-10-CM

## 2021-11-26 DIAGNOSIS — F31.9 BIPOLAR AFFECTIVE DISORDER, REMISSION STATUS UNSPECIFIED (HCC): ICD-10-CM

## 2021-11-26 DIAGNOSIS — R13.10 DYSPHAGIA, UNSPECIFIED TYPE: Primary | ICD-10-CM

## 2021-11-26 PROCEDURE — 99316 NF DSCHRG MGMT 30 MIN+: CPT | Performed by: NURSE PRACTITIONER

## 2021-11-26 RX ORDER — GABAPENTIN 100 MG/1
100 CAPSULE ORAL
COMMUNITY

## 2022-05-26 ENCOUNTER — HOSPITAL ENCOUNTER (INPATIENT)
Facility: HOSPITAL | Age: 42
LOS: 1 days | Discharge: HOME | DRG: 315 | End: 2022-05-27
Attending: EMERGENCY MEDICINE
Payer: MEDICARE

## 2022-05-26 ENCOUNTER — APPOINTMENT (EMERGENCY)
Dept: RADIOLOGY | Facility: HOSPITAL | Age: 42
DRG: 315 | End: 2022-05-26
Payer: MEDICARE

## 2022-05-26 ENCOUNTER — APPOINTMENT (INPATIENT)
Dept: CT IMAGING | Facility: HOSPITAL | Age: 42
DRG: 315 | End: 2022-05-26
Payer: MEDICARE

## 2022-05-26 DIAGNOSIS — I95.9 HYPOTENSION: Primary | ICD-10-CM

## 2022-05-26 DIAGNOSIS — R94.31 PROLONGED Q-T INTERVAL ON ECG: ICD-10-CM

## 2022-05-26 DIAGNOSIS — E86.0 DEHYDRATION: ICD-10-CM

## 2022-05-26 DIAGNOSIS — R00.1 SINUS BRADYCARDIA: ICD-10-CM

## 2022-05-26 DIAGNOSIS — F31.9 BIPOLAR AFFECTIVE DISORDER, REMISSION STATUS UNSPECIFIED (HCC): ICD-10-CM

## 2022-05-26 DIAGNOSIS — D69.6 THROMBOCYTOPENIA (HCC): ICD-10-CM

## 2022-05-26 PROBLEM — R63.4 WEIGHT LOSS: Status: ACTIVE | Noted: 2022-05-26

## 2022-05-26 LAB
ALBUMIN SERPL BCP-MCNC: 3.3 G/DL (ref 3.5–5)
ALP SERPL-CCNC: 102 U/L (ref 46–116)
ALT SERPL W P-5'-P-CCNC: 12 U/L (ref 12–78)
ANION GAP SERPL CALCULATED.3IONS-SCNC: 6 MMOL/L (ref 4–13)
APTT PPP: 28 SECONDS (ref 23–37)
AST SERPL W P-5'-P-CCNC: 36 U/L (ref 5–45)
ATRIAL RATE: 375 BPM
ATRIAL RATE: 60 BPM
BACTERIA UR QL AUTO: ABNORMAL /HPF
BASOPHILS # BLD AUTO: 0.01 THOUSANDS/ΜL (ref 0–0.1)
BASOPHILS NFR BLD AUTO: 0 % (ref 0–1)
BILIRUB SERPL-MCNC: 0.49 MG/DL (ref 0.2–1)
BILIRUB UR QL STRIP: NEGATIVE
BUN SERPL-MCNC: 13 MG/DL (ref 5–25)
CALCIUM ALBUM COR SERPL-MCNC: 9.2 MG/DL (ref 8.3–10.1)
CALCIUM SERPL-MCNC: 8.6 MG/DL (ref 8.3–10.1)
CARDIAC TROPONIN I PNL SERPL HS: <2 NG/L
CARDIAC TROPONIN I PNL SERPL HS: <2 NG/L
CHLORIDE SERPL-SCNC: 105 MMOL/L (ref 100–108)
CLARITY UR: CLEAR
CO2 SERPL-SCNC: 27 MMOL/L (ref 21–32)
COLOR UR: YELLOW
CREAT SERPL-MCNC: 0.89 MG/DL (ref 0.6–1.3)
EOSINOPHIL # BLD AUTO: 0.07 THOUSAND/ΜL (ref 0–0.61)
EOSINOPHIL NFR BLD AUTO: 1 % (ref 0–6)
ERYTHROCYTE [DISTWIDTH] IN BLOOD BY AUTOMATED COUNT: 13.7 % (ref 11.6–15.1)
GFR SERPL CREATININE-BSD FRML MDRD: 106 ML/MIN/1.73SQ M
GLUCOSE SERPL-MCNC: 82 MG/DL (ref 65–140)
GLUCOSE SERPL-MCNC: 89 MG/DL (ref 65–140)
GLUCOSE UR STRIP-MCNC: NEGATIVE MG/DL
HCT VFR BLD AUTO: 45.1 % (ref 36.5–49.3)
HGB BLD-MCNC: 14 G/DL (ref 12–17)
HGB UR QL STRIP.AUTO: ABNORMAL
IMM GRANULOCYTES # BLD AUTO: 0 THOUSAND/UL (ref 0–0.2)
IMM GRANULOCYTES NFR BLD AUTO: 0 % (ref 0–2)
INR PPP: 1.12 (ref 0.84–1.19)
KETONES UR STRIP-MCNC: ABNORMAL MG/DL
LACTATE SERPL-SCNC: 1.8 MMOL/L (ref 0.5–2)
LEUKOCYTE ESTERASE UR QL STRIP: NEGATIVE
LYMPHOCYTES # BLD AUTO: 0.85 THOUSANDS/ΜL (ref 0.6–4.47)
LYMPHOCYTES NFR BLD AUTO: 17 % (ref 14–44)
MCH RBC QN AUTO: 27.9 PG (ref 26.8–34.3)
MCHC RBC AUTO-ENTMCNC: 31 G/DL (ref 31.4–37.4)
MCV RBC AUTO: 90 FL (ref 82–98)
MONOCYTES # BLD AUTO: 0.61 THOUSAND/ΜL (ref 0.17–1.22)
MONOCYTES NFR BLD AUTO: 12 % (ref 4–12)
NEUTROPHILS # BLD AUTO: 3.54 THOUSANDS/ΜL (ref 1.85–7.62)
NEUTS SEG NFR BLD AUTO: 70 % (ref 43–75)
NITRITE UR QL STRIP: NEGATIVE
NON-SQ EPI CELLS URNS QL MICRO: ABNORMAL /HPF
NRBC BLD AUTO-RTO: 0 /100 WBCS
NT-PROBNP SERPL-MCNC: 496 PG/ML
P AXIS: 71 DEGREES
PH UR STRIP.AUTO: 7 [PH] (ref 4.5–8)
PLATELET # BLD AUTO: 128 THOUSANDS/UL (ref 149–390)
PMV BLD AUTO: 10 FL (ref 8.9–12.7)
POTASSIUM SERPL-SCNC: 5 MMOL/L (ref 3.5–5.3)
PR INTERVAL: 104 MS
PROCALCITONIN SERPL-MCNC: <0.05 NG/ML
PROT SERPL-MCNC: 6.5 G/DL (ref 6.4–8.2)
PROT UR STRIP-MCNC: ABNORMAL MG/DL
PROTHROMBIN TIME: 14.1 SECONDS (ref 11.6–14.5)
QRS AXIS: 92 DEGREES
QRS AXIS: 97 DEGREES
QRSD INTERVAL: 78 MS
QRSD INTERVAL: 80 MS
QT INTERVAL: 508 MS
QT INTERVAL: 566 MS
QTC INTERVAL: 508 MS
QTC INTERVAL: 536 MS
RBC # BLD AUTO: 5.01 MILLION/UL (ref 3.88–5.62)
RBC #/AREA URNS AUTO: ABNORMAL /HPF
SODIUM SERPL-SCNC: 138 MMOL/L (ref 136–145)
SP GR UR STRIP.AUTO: 1.02 (ref 1–1.03)
T WAVE AXIS: 79 DEGREES
T WAVE AXIS: 85 DEGREES
TSH SERPL DL<=0.05 MIU/L-ACNC: 1.38 UIU/ML (ref 0.45–4.5)
UROBILINOGEN UR QL STRIP.AUTO: 1 E.U./DL
VALPROATE SERPL-MCNC: <3 UG/ML (ref 50–100)
VENTRICULAR RATE: 54 BPM
VENTRICULAR RATE: 60 BPM
WBC # BLD AUTO: 5.08 THOUSAND/UL (ref 4.31–10.16)
WBC #/AREA URNS AUTO: ABNORMAL /HPF

## 2022-05-26 PROCEDURE — 83880 ASSAY OF NATRIURETIC PEPTIDE: CPT | Performed by: EMERGENCY MEDICINE

## 2022-05-26 PROCEDURE — 83605 ASSAY OF LACTIC ACID: CPT | Performed by: EMERGENCY MEDICINE

## 2022-05-26 PROCEDURE — 80053 COMPREHEN METABOLIC PANEL: CPT | Performed by: EMERGENCY MEDICINE

## 2022-05-26 PROCEDURE — 99285 EMERGENCY DEPT VISIT HI MDM: CPT

## 2022-05-26 PROCEDURE — 99223 1ST HOSP IP/OBS HIGH 75: CPT | Performed by: INTERNAL MEDICINE

## 2022-05-26 PROCEDURE — 84145 PROCALCITONIN (PCT): CPT | Performed by: INTERNAL MEDICINE

## 2022-05-26 PROCEDURE — 71250 CT THORAX DX C-: CPT

## 2022-05-26 PROCEDURE — 84443 ASSAY THYROID STIM HORMONE: CPT | Performed by: EMERGENCY MEDICINE

## 2022-05-26 PROCEDURE — 85025 COMPLETE CBC W/AUTO DIFF WBC: CPT | Performed by: EMERGENCY MEDICINE

## 2022-05-26 PROCEDURE — 99285 EMERGENCY DEPT VISIT HI MDM: CPT | Performed by: EMERGENCY MEDICINE

## 2022-05-26 PROCEDURE — 36415 COLL VENOUS BLD VENIPUNCTURE: CPT | Performed by: EMERGENCY MEDICINE

## 2022-05-26 PROCEDURE — 81001 URINALYSIS AUTO W/SCOPE: CPT

## 2022-05-26 PROCEDURE — 93010 ELECTROCARDIOGRAM REPORT: CPT | Performed by: INTERNAL MEDICINE

## 2022-05-26 PROCEDURE — 84484 ASSAY OF TROPONIN QUANT: CPT | Performed by: EMERGENCY MEDICINE

## 2022-05-26 PROCEDURE — 80164 ASSAY DIPROPYLACETIC ACD TOT: CPT | Performed by: INTERNAL MEDICINE

## 2022-05-26 PROCEDURE — 87040 BLOOD CULTURE FOR BACTERIA: CPT | Performed by: EMERGENCY MEDICINE

## 2022-05-26 PROCEDURE — 85730 THROMBOPLASTIN TIME PARTIAL: CPT | Performed by: EMERGENCY MEDICINE

## 2022-05-26 PROCEDURE — 96365 THER/PROPH/DIAG IV INF INIT: CPT

## 2022-05-26 PROCEDURE — 85610 PROTHROMBIN TIME: CPT | Performed by: EMERGENCY MEDICINE

## 2022-05-26 PROCEDURE — 93005 ELECTROCARDIOGRAM TRACING: CPT

## 2022-05-26 PROCEDURE — 82948 REAGENT STRIP/BLOOD GLUCOSE: CPT

## 2022-05-26 PROCEDURE — 96361 HYDRATE IV INFUSION ADD-ON: CPT

## 2022-05-26 PROCEDURE — G1004 CDSM NDSC: HCPCS

## 2022-05-26 PROCEDURE — 71045 X-RAY EXAM CHEST 1 VIEW: CPT

## 2022-05-26 RX ORDER — IBUPROFEN 400 MG/1
TABLET ORAL EVERY 6 HOURS PRN
COMMUNITY

## 2022-05-26 RX ORDER — ALBUMIN, HUMAN INJ 5% 5 %
12.5 SOLUTION INTRAVENOUS ONCE
Status: COMPLETED | OUTPATIENT
Start: 2022-05-26 | End: 2022-05-26

## 2022-05-26 RX ORDER — PANTOPRAZOLE SODIUM 20 MG/1
20 TABLET, DELAYED RELEASE ORAL
Status: DISCONTINUED | OUTPATIENT
Start: 2022-05-27 | End: 2022-05-27 | Stop reason: HOSPADM

## 2022-05-26 RX ORDER — POLYETHYLENE GLYCOL 3350 17 G/17G
17 POWDER, FOR SOLUTION ORAL DAILY
COMMUNITY

## 2022-05-26 RX ORDER — ONDANSETRON 2 MG/ML
4 INJECTION INTRAMUSCULAR; INTRAVENOUS EVERY 6 HOURS PRN
Status: DISCONTINUED | OUTPATIENT
Start: 2022-05-26 | End: 2022-05-27 | Stop reason: HOSPADM

## 2022-05-26 RX ORDER — GABAPENTIN 100 MG/1
100 CAPSULE ORAL
Status: CANCELLED | OUTPATIENT
Start: 2022-05-26

## 2022-05-26 RX ORDER — DIVALPROEX SODIUM 125 MG/1
125 TABLET, DELAYED RELEASE ORAL EVERY 8 HOURS SCHEDULED
Status: CANCELLED | OUTPATIENT
Start: 2022-05-26

## 2022-05-26 RX ORDER — SODIUM CHLORIDE 9 MG/ML
125 INJECTION, SOLUTION INTRAVENOUS CONTINUOUS
Status: DISCONTINUED | OUTPATIENT
Start: 2022-05-26 | End: 2022-05-27

## 2022-05-26 RX ORDER — CITALOPRAM 20 MG/1
20 TABLET ORAL DAILY
Status: DISCONTINUED | OUTPATIENT
Start: 2022-05-27 | End: 2022-05-27 | Stop reason: HOSPADM

## 2022-05-26 RX ORDER — BENZTROPINE MESYLATE 1 MG/1
1 TABLET ORAL 2 TIMES DAILY
Status: DISCONTINUED | OUTPATIENT
Start: 2022-05-26 | End: 2022-05-27 | Stop reason: HOSPADM

## 2022-05-26 RX ORDER — ALBUTEROL SULFATE 2.5 MG/3ML
2.5 SOLUTION RESPIRATORY (INHALATION) EVERY 6 HOURS PRN
Status: DISCONTINUED | OUTPATIENT
Start: 2022-05-26 | End: 2022-05-27 | Stop reason: HOSPADM

## 2022-05-26 RX ORDER — DOCUSATE SODIUM 100 MG/1
100 CAPSULE, LIQUID FILLED ORAL 2 TIMES DAILY
Status: DISCONTINUED | OUTPATIENT
Start: 2022-05-26 | End: 2022-05-27 | Stop reason: HOSPADM

## 2022-05-26 RX ORDER — MAGNESIUM HYDROXIDE/ALUMINUM HYDROXICE/SIMETHICONE 120; 1200; 1200 MG/30ML; MG/30ML; MG/30ML
30 SUSPENSION ORAL EVERY 6 HOURS PRN
Status: DISCONTINUED | OUTPATIENT
Start: 2022-05-26 | End: 2022-05-27 | Stop reason: HOSPADM

## 2022-05-26 RX ORDER — CLOTRIMAZOLE 1 %
CREAM (GRAM) TOPICAL 2 TIMES DAILY
COMMUNITY

## 2022-05-26 RX ORDER — MAGNESIUM SULFATE HEPTAHYDRATE 40 MG/ML
2 INJECTION, SOLUTION INTRAVENOUS ONCE
Status: COMPLETED | OUTPATIENT
Start: 2022-05-26 | End: 2022-05-26

## 2022-05-26 RX ORDER — CALCIUM CARBONATE 200(500)MG
1000 TABLET,CHEWABLE ORAL DAILY PRN
Status: DISCONTINUED | OUTPATIENT
Start: 2022-05-26 | End: 2022-05-27 | Stop reason: HOSPADM

## 2022-05-26 RX ORDER — BENZTROPINE MESYLATE 1 MG/1
1 TABLET ORAL 2 TIMES DAILY
COMMUNITY

## 2022-05-26 RX ORDER — ALBUTEROL SULFATE 2.5 MG/3ML
2.5 SOLUTION RESPIRATORY (INHALATION) EVERY 6 HOURS PRN
COMMUNITY

## 2022-05-26 RX ORDER — ACETAMINOPHEN 325 MG/1
650 TABLET ORAL EVERY 6 HOURS PRN
Status: DISCONTINUED | OUTPATIENT
Start: 2022-05-26 | End: 2022-05-27 | Stop reason: HOSPADM

## 2022-05-26 RX ADMIN — SODIUM CHLORIDE 2000 ML: 0.9 INJECTION, SOLUTION INTRAVENOUS at 11:22

## 2022-05-26 RX ADMIN — CARBIDOPA AND LEVODOPA 1.5 TABLET: 25; 100 TABLET ORAL at 21:35

## 2022-05-26 RX ADMIN — CARBIDOPA AND LEVODOPA 1.5 TABLET: 25; 100 TABLET ORAL at 17:21

## 2022-05-26 RX ADMIN — BIMATOPROST 1 DROP: 0.1 SOLUTION/ DROPS OPHTHALMIC at 21:35

## 2022-05-26 RX ADMIN — SODIUM CHLORIDE 1000 ML: 0.9 INJECTION, SOLUTION INTRAVENOUS at 12:52

## 2022-05-26 RX ADMIN — ALBUMIN (HUMAN) 12.5 G: 12.5 INJECTION, SOLUTION INTRAVENOUS at 15:22

## 2022-05-26 RX ADMIN — BENZTROPINE MESYLATE 1 MG: 1 TABLET ORAL at 17:22

## 2022-05-26 RX ADMIN — CEFTRIAXONE SODIUM 1000 MG: 10 INJECTION, POWDER, FOR SOLUTION INTRAVENOUS at 11:35

## 2022-05-26 RX ADMIN — MAGNESIUM SULFATE HEPTAHYDRATE 2 G: 40 INJECTION, SOLUTION INTRAVENOUS at 15:27

## 2022-05-26 RX ADMIN — DOCUSATE SODIUM 100 MG: 100 CAPSULE, LIQUID FILLED ORAL at 17:22

## 2022-05-26 RX ADMIN — SODIUM CHLORIDE 125 ML/HR: 0.9 INJECTION, SOLUTION INTRAVENOUS at 16:51

## 2022-05-26 NOTE — ASSESSMENT & PLAN NOTE
Will order CT of the chest Will at any occult pathology  Should have age-appropriate cancer screenings  Caretakers report that he has previously seen Gastroenterology

## 2022-05-26 NOTE — ASSESSMENT & PLAN NOTE
Patient with unexplained hypotension, bradycardia  Check procalcitonin - occult infection remains in the differential diagnosis  Received 1 dose of ceftriaxone, continue for now  Initially concern for medication induced, previously on Depakote  Medications administered is group home, will check Depakote level to ensure no wrong medications were given  Recently started at the beginning of the month and carbidopa levodopa for ill-defined mood disorder  Check CT of the chest to rule out any acute chest pathology  Echocardiogram given hypotension and bradycardia  Consider adrenal insufficiency- ACTH Stim test in the AM if no improvement in BP

## 2022-05-26 NOTE — H&P
[address] [facility]  H&P- Danny Ochoa 1980, 39 y o  male MRN: [id number]  Unit/Bed#: ED 28 Encounter: [id number]  Primary Care Provider: Ismael Smith MD   Date and time admitted to hospital: 5/26/2022 11:11 AM    Assessment and Plan  * Hypotension  Assessment & Plan  Patient with unexplained hypotension, bradycardia  Check procalcitonin - occult infection remains in the differential diagnosis  Received 1 dose of ceftriaxone, continue for now  Initially concern for medication induced, previously on Depakote  Medications administered is group home, will check Depakote level to ensure no wrong medications were given  Recently started at the beginning of the month and carbidopa levodopa for ill-defined mood disorder  Check CT of the chest to rule out any acute chest pathology  Echocardiogram given hypotension and bradycardia  Consider adrenal insufficiency- ACTH Stim test in the AM if no improvement in BP    Weight loss  Assessment & Plan  Will order CT of the chest Will at any occult pathology  Should have age-appropriate cancer screenings  Caretakers report that he has previously seen Gastroenterology    Glaucoma  Assessment & Plan  Resume home eye drops    Dysphagia  Assessment & Plan  History of dysphagia on nectar thick liquids  Has had approximately 20 lb weights  Nutrition consultation    Ambulatory dysfunction  Assessment & Plan  History of ambulatory dysfunction setting of moderate intellectual disability  Currently on carbidopa levodopa per his [facility] neurology  Reported improvement by his caretaker  Continue for now    Bipolar disorder [facility])  Assessment & Plan  Mood is stable, maintained on Celexa,   No longer on Depakote        Code Status: FULL CODE    VTE Prophylaxis: Early ambulation  / sequential compression device     POLST: There is no POLST form on file for this patient (pre-hospital)  Discussion with family:  Discussed with caretakers at bedside    Anticipated Length of Stay:  Patient will be admitted on an Inpatient basis with an anticipated length of stay of  greater than 2 midnights  Justification for Hospital Stay: Hypotension    Total Time for Visit, including Counseling / Coordination of Care: 45 minutes  Greater than 50% of this total time spent on direct patient counseling and coordination of care  Chief Complaint:     Chief Complaint   Patient presents with    Weakness - Generalized     Per group home staff, pt had sudden onset of weakness and lethargy  Pt also more pale than usual   Pt present extremely dehydrated  History of Present Illness:    Marie Bowman is a 39 y o  male who presents with weakness, diaphoresis, and hypotension  Patient has a history of moderate intellectual disabilities as well as bipolar disorder  He reports that he was on a day trip, he was outside  There was no nausea vomiting or diarrhea  No head injury  Throughout the day they noted that he had become more weak  This was generalized weakness  He also reports that he has had 20 lb weight loss over the past few months  Denies any recent medication changes, the patient due to intellectual disability cannot provide an accurate history  There is no reported chest pain  He was recently started on carbidopa levodopa for 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 which has improved his gait    The patient denies any other acute complaints  Review of Systems:    A complete and comprehensive 14 point organ system review was performed and all other systems are negative other than stated above in the HPI    Past Medical and Surgical History:     Past Medical History:   Diagnosis Date    ADHD     Depression     Depression     Dysphagia     Incontinence     Intermittent explosive disorder     Mood disorder ([facility] [address] )     MR (mental retardation)     MR (mental retardation), moderate     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  Authorizing Provider   acetaminophen (TYLENOL) 500 mg tablet Take 500 mg by mouth every 6 (six) hours as needed   Yes Historical Provider, MD   albuterol (2 5 mg/3 mL) 0 083 % nebulizer solution Take 2 5 mg by nebulization every 6 (six) hours as needed for wheezing or shortness of breath   Yes Historical Provider, MD   Albuterol Sulfate (VENTOLIN HFA IN) Inhale 1 puff as needed   Yes Historical Provider, MD   Balsam [place] Oil OINT Apply 1 application topically 2 (two) times a day   Yes Historical Provider, MD   benztropine (COGENTIN) 1 mg tablet Take 1 mg by mouth 2 (two) times a day   Yes Historical Provider, MD   brimonidine-timolol (COMBIGAN) 0 2-0 5 % Administer 1 drop to both eyes every 12 (twelve) hours   Yes Historical Provider, MD   citalopram (CeleXA) 10 mg tablet Take 10 mg by mouth daily   Yes Historical Provider, MD   clotrimazole (LOTRIMIN) 1 % cream Apply topically 2 (two) times a day   Yes Historical Provider, MD   diphenhydrAMINE (BENADRYL) 25 mg tablet Take 25 mg by mouth as needed for itching   Yes Historical Provider, MD   divalproex sodium (DEPAKOTE) 125 mg EC tablet Take 125 mg by mouth every 8 (eight) hours   Yes Historical Provider, MD   fluticasone (FLONASE) 50 mcg/act nasal spray 2 sprays into each nostril daily   Yes Historical Provider, MD   ibuprofen (MOTRIN) 400 mg tablet Take by mouth every 6 (six) hours as needed for mild pain   Yes Historical Provider, MD   miconazole 2 % cream Apply 1 application topically 2 (two) times a day   Yes Historical Provider, MD   polyethylene glycol (MIRALAX) 17 g packet Take 17 g by mouth daily   Yes Historical Provider, MD   selenium sulfide (SELSUN) 2 5 % shampoo Apply 1 application topically as needed 2x per week PRN   Yes Historical Provider, MD   ZINC OXIDE, TOPICAL, 10 % CREA Apply 1 application topically 2 (two) times a day groin   Yes Historical Provider, MD   brinzolamide (AZOPT) 1 % ophthalmic suspension Administer 1 drop to both eyes 3 (three) times a day  Patient not taking: Reported on 5/26/2022    Historical Provider, MD   gabapentin (NEURONTIN) 100 mg capsule Take 100 mg by mouth daily at bedtime    Historical Provider, MD   Lidocaine 4 % PTCH Apply 1 patch topically daily hip    Historical Provider, MD   senna (SENOKOT) 8 6 MG tablet Take 1 tablet by mouth 2 (two) times a day  Patient not taking: Reported on 5/26/2022    Historical Provider, MD     I have reviewed home medications with patient personally  Allergies: No Known Allergies    Social History:     Marital Status: Single   Occupation:  Disabled    Substance Use History:   Social History     Substance and Sexual Activity   Alcohol Use No     Social History     Tobacco Use   Smoking Status Never Smoker   Smokeless Tobacco Never Used     Social History     Substance and Sexual Activity   Drug Use Not Currently       Family History:    No family history on file      Physical Exam:     Vitals:   Blood Pressure: 109/78 (05/26/22 1430)  Pulse: 68 (05/26/22 1430)  Temperature: (!) 97 2 °F (36 2 °C) (05/26/22 1135)  Temp Source: Rectal (05/26/22 1135)  Respirations: 16 (05/26/22 1430)  Weight - Scale: 52 1 kg (114 lb 13 8 oz) (05/26/22 1115)  SpO2: 100 % (05/26/22 1430)      General:  Appears thin, no acute distress  HEENT: atraumatic, PERRLA, moist mucosa, normal pharynx, normal tonsils and adenoids, normal tongue, no fluid in sinuses  Neck: Trachea midline, no carotid bruit, no masses  Respiratory: normal chest wall expansion, CTA B, no r/r/w, no rubs  Cardiovascular: RRR, 3/6 systolic ejection murmur, Normal S1 and S2  Abdomen: Soft, non-tender, non-distended, normal bowel sounds in all quadrants, no hepatosplenomegaly, no tympany  Rectal: deferred  Musculoskeletal: Moves all  Integumentary: warm, dry, and pink, with no rash, purpura, or petechia  Heme/Lymph: no lymphadenopathy, no bruises  Neurological: Cranial Nerves II-XII grossly intact  Psychiatric:  Cooperative    Additional Data:     Lab Results: I have personally reviewed pertinent reports  Results from last 7 days   Lab Units 05/26/22  1126   WBC Thousand/uL 5 08   HEMOGLOBIN g/dL 14 0   HEMATOCRIT % 45 1   PLATELETS Thousands/uL 128*   NEUTROS PCT % 70   LYMPHS PCT % 17   MONOS PCT % 12   EOS PCT % 1     Results from last 7 days   Lab Units 05/26/22  1126   SODIUM mmol/L 138   POTASSIUM mmol/L 5 0   CHLORIDE mmol/L 105   CO2 mmol/L 27   BUN mg/dL 13   CREATININE mg/dL 0 89   ANION GAP mmol/L 6   CALCIUM mg/dL 8 6   ALBUMIN g/dL 3 3*   TOTAL BILIRUBIN mg/dL 0 49   ALK PHOS U/L 102   ALT U/L 12   AST U/L 36   GLUCOSE RANDOM mg/dL 82     Results from last 7 days   Lab Units 05/26/22  1128   INR  1 12     Results from last 7 days   Lab Units 05/26/22  1112   POC GLUCOSE mg/dl 89         Results from last 7 days   Lab Units 05/26/22  1127   LACTIC ACID mmol/L 1 8     Results from last 7 days   Lab Units 05/26/22  1358 05/26/22  1126   HS TNI 0HR ng/L  --  <2   HS TNI 2HR ng/L <2  --        Imaging: I have personally reviewed pertinent reports  XR chest 1 view portable   ED Interpretation by Liang Aldana MD (05/26 6567)   Primary reviewed: no acute abnormality       by Ana Paula Aguilar MD (05/26 4461)      CT chest wo contrast    (Results Pending)       EKG, Pathology, and Other Studies Reviewed on Admission:   · Chest x-ray reviewed, no acute abnormality    Allscripts / Epic Records Reviewed: Yes     ** Please Note: This note was completed in part utilizing [employer] Direct Software  Grammatical errors, random word insertions, spelling mistakes, and incomplete sentences may be an occasional consequence of this system secondary to software limitations, ambient noise, and hardware issues    If you have any questions or concerns about the content, text, or information contained within the body of this dictation, please contact the provider for clarification  **

## 2022-05-26 NOTE — PLAN OF CARE
Problem: Potential for Falls  Goal: Patient will remain free of falls  Description: INTERVENTIONS:  - Educate patient/family on patient safety including physical limitations  - Instruct patient to call for assistance with activity   - Consult OT/PT to assist with strengthening/mobility   - Keep Call bell within reach  - Keep bed low and locked with side rails adjusted as appropriate  - Keep care items and personal belongings within reach  - Initiate and maintain comfort rounds  - Make Fall Risk Sign visible to staff  - Offer Toileting every 3 Hours, in advance of need  - Initiate/Maintain 3alarm  - Obtain necessary fall risk management equipment: 3  - Apply yellow socks and bracelet for high fall risk patients  - Consider moving patient to room near nurses station  Outcome: Progressing     Problem: MOBILITY - ADULT  Goal: Maintain or return to baseline ADL function  Description: INTERVENTIONS:  -  Assess patient's ability to carry out ADLs; assess patient's baseline for ADL function and identify physical deficits which impact ability to perform ADLs (bathing, care of mouth/teeth, toileting, grooming, dressing, etc )  - Assess/evaluate cause of self-care deficits   - Assess range of motion  - Assess patient's mobility; develop plan if impaired  - Assess patient's need for assistive devices and provide as appropriate  - Encourage maximum independence but intervene and supervise when necessary  - Involve family in performance of ADLs  - Assess for home care needs following discharge   - Consider OT consult to assist with ADL evaluation and planning for discharge  - Provide patient education as appropriate  Outcome: Progressing  Goal: Maintains/Returns to pre admission functional level  Description: INTERVENTIONS:  - Perform BMAT or MOVE assessment daily    - Set and communicate daily mobility goal to care team and patient/family/caregiver     - Collaborate with rehabilitation services on mobility goals if consulted  - Perform Range of Motion 3 times a day  - Reposition patient every 3 hours    - Dangle patient 3 times a day  - Stand patient 3 times a day  - Ambulate patient 3 times a day  - Out of bed to chair 3 times a day   - Out of bed for meals 3 times a day  - Out of bed for toileting  - Record patient progress and toleration of activity level   Outcome: Progressing     Problem: PAIN - ADULT  Goal: Verbalizes/displays adequate comfort level or baseline comfort level  Description: Interventions:  - Encourage patient to monitor pain and request assistance  - Assess pain using appropriate pain scale  - Administer analgesics based on type and severity of pain and evaluate response  - Implement non-pharmacological measures as appropriate and evaluate response  - Consider cultural and social influences on pain and pain management  - Notify physician/advanced practitioner if interventions unsuccessful or patient reports new pain  Outcome: Progressing     Problem: INFECTION - ADULT  Goal: Absence or prevention of progression during hospitalization  Description: INTERVENTIONS:  - Assess and monitor for signs and symptoms of infection  - Monitor lab/diagnostic results  - Monitor all insertion sites, i e  indwelling lines, tubes, and drains  - Monitor endotracheal if appropriate and nasal secretions for changes in amount and color  - [place] appropriate cooling/warming therapies per order  - Administer medications as ordered  - Instruct and encourage patient and family to use good hand hygiene technique  - Identify and instruct in appropriate isolation precautions for identified infection/condition  Outcome: Progressing     Problem: SAFETY ADULT  Goal: Patient will remain free of falls  Description: INTERVENTIONS:  - Educate patient/family on patient safety including physical limitations  - Instruct patient to call for assistance with activity   - Consult OT/PT to assist with strengthening/mobility   - Keep Call bell within reach  - Keep bed low and locked with side rails adjusted as appropriate  - Keep care items and personal belongings within reach  - Initiate and maintain comfort rounds  - Make Fall Risk Sign visible to staff  - Offer Toileting every 3 Hours, in advance of need  - Initiate/Maintain 3alarm  - Obtain necessary fall risk management equipment: 3  - Apply yellow socks and bracelet for high fall risk patients  - Consider moving patient to room near nurses station  Outcome: Progressing  Goal: Maintain or return to baseline ADL function  Description: INTERVENTIONS:  -  Assess patient's ability to carry out ADLs; assess patient's baseline for ADL function and identify physical deficits which impact ability to perform ADLs (bathing, care of mouth/teeth, toileting, grooming, dressing, etc )  - Assess/evaluate cause of self-care deficits   - Assess range of motion  - Assess patient's mobility; develop plan if impaired  - Assess patient's need for assistive devices and provide as appropriate  - Encourage maximum independence but intervene and supervise when necessary  - Involve family in performance of ADLs  - Assess for home care needs following discharge   - Consider OT consult to assist with ADL evaluation and planning for discharge  - Provide patient education as appropriate  Outcome: Progressing  Goal: Maintains/Returns to pre admission functional level  Description: INTERVENTIONS:  - Perform BMAT or MOVE assessment daily    - Set and communicate daily mobility goal to care team and patient/family/caregiver  - Collaborate with rehabilitation services on mobility goals if consulted  - Perform Range of Motion 3 times a day  - Reposition patient every 3 hours    - Dangle patient 3 times a day  - Stand patient 3 times a day  - Ambulate patient 3 times a day  - Out of bed to chair 3 times a day   - Out of bed for meals 3 times a day  - Out of bed for toileting  - Record patient progress and toleration of activity level   Outcome: Progressing     Problem: DISCHARGE PLANNING  Goal: Discharge to home or other facility with appropriate resources  Description: INTERVENTIONS:  - Identify barriers to discharge w/patient and caregiver  - Arrange for needed discharge resources and transportation as appropriate  - Identify discharge learning needs (meds, wound care, etc )  - Arrange for interpretive services to assist at discharge as needed  - Refer to Case Management Department for coordinating discharge planning if the patient needs post-hospital services based on physician/advanced practitioner order or complex needs related to functional status, cognitive ability, or social support system  Outcome: Progressing

## 2022-05-26 NOTE — ASSESSMENT & PLAN NOTE
History of dysphagia on nectar thick liquids  Has had approximately 20 lb weights  Nutrition consultation

## 2022-05-26 NOTE — ED PROVIDER NOTES
History  Chief Complaint   Patient presents with    Weakness - Generalized     Per group home staff, pt had sudden onset of weakness and lethargy  Pt also more pale than usual   Pt present extremely dehydrated  80-year-old male presents for evaluation of sudden onset of weakness and fatigue  Has been present for several hours, is constant, slowly improving  Patient offers no complaints at this time  No recent falls or head trauma      History provided by:  Patient and caregiver      Prior to Admission Medications   Prescriptions Last Dose Informant Patient Reported? Taking?    Albuterol Sulfate (VENTOLIN HFA IN)   Yes Yes   Sig: Inhale 1 puff as needed   Balsam [place] Oil OINT   Yes Yes   Sig: Apply 1 application topically 2 (two) times a day   Lidocaine 4 % PTCH   Yes No   Sig: Apply 1 patch topically daily hip   ZINC OXIDE, TOPICAL, 10 % CREA   Yes Yes   Sig: Apply 1 application topically 2 (two) times a day groin   acetaminophen (TYLENOL) 500 mg tablet   Yes Yes   Sig: Take 500 mg by mouth every 6 (six) hours as needed   albuterol (2 5 mg/3 mL) 0 083 % nebulizer solution  Outside Facility (Specify) Yes Yes   Sig: Take 2 5 mg by nebulization every 6 (six) hours as needed for wheezing or shortness of breath   benztropine (COGENTIN) 1 mg tablet   Yes Yes   Sig: Take 1 mg by mouth 2 (two) times a day   brimonidine-timolol (COMBIGAN) 0 2-0 5 %   Yes Yes   Sig: Administer 1 drop to both eyes every 12 (twelve) hours   brinzolamide (AZOPT) 1 % ophthalmic suspension Not Taking at Unknown time  Yes No   Sig: Administer 1 drop to both eyes 3 (three) times a day   Patient not taking: Reported on 5/26/2022   citalopram (CeleXA) 10 mg tablet   Yes Yes   Sig: Take 10 mg by mouth daily   clotrimazole (LOTRIMIN) 1 % cream  Outside Facility (Specify) Yes Yes   Sig: Apply topically 2 (two) times a day   diphenhydrAMINE (BENADRYL) 25 mg tablet   Yes Yes   Sig: Take 25 mg by mouth as needed for itching   divalproex sodium (DEPAKOTE) 125 mg EC tablet   Yes Yes   Sig: Take 125 mg by mouth every 8 (eight) hours   fluticasone (FLONASE) 50 mcg/act nasal spray   Yes Yes   Si sprays into each nostril daily   gabapentin (NEURONTIN) 100 mg capsule   Yes No   Sig: Take 100 mg by mouth daily at bedtime   ibuprofen (MOTRIN) 400 mg tablet  Outside Facility (Specify) Yes Yes   Sig: Take by mouth every 6 (six) hours as needed for mild pain   miconazole 2 % cream   Yes Yes   Sig: Apply 1 application topically 2 (two) times a day   polyethylene glycol (MIRALAX) 17 g packet   Yes Yes   Sig: Take 17 g by mouth daily   selenium sulfide (SELSUN) 2 5 % shampoo   Yes Yes   Sig: Apply 1 application topically as needed 2x per week PRN   senna (SENOKOT) 8 6 MG tablet Not Taking at Unknown time  Yes No   Sig: Take 1 tablet by mouth 2 (two) times a day   Patient not taking: Reported on 2022      Facility-Administered Medications: None       Past Medical History:   Diagnosis Date    ADHD     Depression     Depression     Dysphagia     Incontinence     Intermittent explosive disorder     Mood disorder (HCC)     MR (mental retardation)     MR (mental retardation), moderate     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     Oppositional defiant disorder     Oppositional defiant disorder     Psychiatric disorder     Seasonal allergies     Thoracic scoliosis        No past surgical history on file  No family history on file  I have reviewed and agree with the history as documented  E-Cigarette/Vaping     E-Cigarette/Vaping Substances     Social History     Tobacco Use    Smoking status: Never Smoker    Smokeless tobacco: Never Used   Substance Use Topics    Alcohol use: No    Drug use: Not Currently       Review of Systems   Constitutional: Negative for activity change, appetite change, fatigue and fever  HENT: Negative for congestion, dental problem, ear pain, rhinorrhea and sore throat      Eyes: Negative for pain and redness  Respiratory: Negative for chest tightness, shortness of breath and wheezing  Cardiovascular: Negative for chest pain and palpitations  Gastrointestinal: Negative for abdominal pain, blood in stool, constipation, diarrhea, nausea and vomiting  Endocrine: Negative for cold intolerance and heat intolerance  Genitourinary: Negative for dysuria, frequency and hematuria  Musculoskeletal: Negative for arthralgias and myalgias  Skin: Negative for color change, pallor and rash  Neurological: Positive for weakness  Negative for numbness  Hematological: Does not bruise/bleed easily  Psychiatric/Behavioral: Negative for agitation, hallucinations and suicidal ideas  Physical Exam  Physical Exam  Vitals and nursing note reviewed  Constitutional:       Appearance: Normal appearance  HENT:      Head: Normocephalic  Right Ear: External ear normal       Left Ear: External ear normal       Nose: Nose normal       Mouth/Throat:      Mouth: Mucous membranes are dry  Eyes:      General: No scleral icterus  Extraocular Movements: Extraocular movements intact  Cardiovascular:      Rate and Rhythm: Regular rhythm  Bradycardia present  Pulmonary:      Effort: Pulmonary effort is normal  No respiratory distress  Breath sounds: Normal breath sounds  Abdominal:      General: There is no distension  Palpations: There is no mass  Tenderness: There is no abdominal tenderness  There is no right CVA tenderness, left CVA tenderness or guarding  Hernia: No hernia is present  Musculoskeletal:      Cervical back: Normal range of motion  No rigidity  Right lower leg: No edema  Left lower leg: No edema  Skin:     General: Skin is dry  Capillary Refill: Capillary refill takes more than 3 seconds  Coloration: Skin is not jaundiced or pale  Findings: No erythema  Neurological:      General: No focal deficit present  Mental Status: He is alert  Sensory: No sensory deficit  Motor: No weakness  Psychiatric:         Mood and Affect: Mood normal          Behavior: Behavior normal          Vital Signs  ED Triage Vitals   Temperature Pulse Respirations Blood Pressure SpO2   05/26/22 1135 05/26/22 1115 05/26/22 1115 05/26/22 1123 05/26/22 1115   (!) 97 2 °F (36 2 °C) (!) 47 14 (!) 82/47 100 %      Temp Source Heart Rate Source Patient Position - Orthostatic VS BP Location FiO2 (%)   05/26/22 1135 05/26/22 1115 05/26/22 1115 05/26/22 1115 --   Rectal Monitor Lying Right arm       Pain Score       --                  Vitals:    05/26/22 1230 05/26/22 1245 05/26/22 1330 05/26/22 1345   BP: (!) 84/55 (!) 83/53 (!) 83/55 (!) 89/60   Pulse:  60 60 60   Patient Position - Orthostatic VS: Lying Lying Lying Lying         Visual Acuity  Visual Acuity    Flowsheet Row Most Recent Value   L Pupil Size (mm) 3   R Pupil Size (mm) 3          ED Medications  Medications   magnesium sulfate 2 g/50 mL IVPB (premix) 2 g (has no administration in time range)   albumin human (FLEXBUMIN) 5 % injection 12 5 g (has no administration in time range)   sodium chloride 0 9 % bolus 2,000 mL (0 mL Intravenous Stopped 5/26/22 1225)   ceftriaxone (ROCEPHIN) 1 g/50 mL in dextrose IVPB (0 mg Intravenous Stopped 5/26/22 1213)   sodium chloride 0 9 % bolus 1,000 mL (1,000 mL Intravenous New Bag 5/26/22 1252)       Diagnostic Studies  Results Reviewed     Procedure Component Value Units Date/Time    Valproic acid level, total [[id number]]     Lab Status: No result Specimen: Blood     HS Troponin I 2hr [[id number]] Collected: 05/26/22 1358    Lab Status:  In process Specimen: Blood from Arm, Right Updated: 05/26/22 1402    Urine Microscopic [[id number]]  (Abnormal) Collected: 05/26/22 1259    Lab Status: Final result Specimen: Urine, Indwelling Lara Catheter Updated: 05/26/22 1351     RBC, UA 4-10 /hpf      WBC, UA 2-4 /hpf      Epithelial Cells Occasional /hpf      Bacteria, UA Occasional /hpf Urine Macroscopic, POC [[id number]]  (Abnormal) Collected: 05/26/22 1259    Lab Status: Final result Specimen: Urine Updated: 05/26/22 1301     Color, UA Yellow     Clarity, UA Clear     pH, UA 7 0     Leukocytes, UA Negative     Nitrite, UA Negative     Protein,  (2+) mg/dl      Glucose, UA Negative mg/dl      Ketones, UA Trace mg/dl      Urobilinogen, UA 1 0 E U /dl      Bilirubin, UA Negative     Blood, UA Trace     Specific [place], UA 1 025    Narrative:      CLINITEK RESULT    HS Troponin I 4hr [[id number]]     Lab Status: No result Specimen: Blood     TSH [[id number]]  (Normal) Collected: 05/26/22 1126    Lab Status: Final result Specimen: Blood from Hand, Left Updated: 05/26/22 1203     TSH 3RD GENERATON 1 383 uIU/mL     Narrative:      Patients undergoing fluorescein dye angiography may retain small amounts of fluorescein in the body for 48-72 hours post procedure  Samples containing fluorescein can produce falsely depressed TSH values  If the patient had this procedure,a specimen should be resubmitted post fluorescein clearance  NT-BNP PRO [[id number]]  (Abnormal) Collected: 05/26/22 1126    Lab Status: Final result Specimen: Blood from Hand, Left Updated: 05/26/22 1203     NT-proBNP 496 pg/mL     Lactic acid [[id number]]  (Normal) Collected: 05/26/22 1127    Lab Status: Final result Specimen: Blood from Hand, Left Updated: 05/26/22 1202     LACTIC ACID 1 8 mmol/L     Narrative:      Result may be elevated if tourniquet was used during collection      HS Troponin 0hr (reflex protocol) [[id number]]  (Normal) Collected: 05/26/22 1126    Lab Status: Final result Specimen: Blood from Hand, Left Updated: 05/26/22 1201     hs TnI 0hr <2 ng/L     Comprehensive metabolic panel [[id number]]  (Abnormal) Collected: 05/26/22 1126    Lab Status: Final result Specimen: Blood from Hand, Left Updated: 05/26/22 1154     Sodium 138 mmol/L      Potassium 5 0 mmol/L      Chloride 105 mmol/L      CO2 27 mmol/L      ANION GAP 6 mmol/L      BUN 13 mg/dL      Creatinine 0 89 mg/dL      Glucose 82 mg/dL      Calcium 8 6 mg/dL      Corrected Calcium 9 2 mg/dL      AST 36 U/L      ALT 12 U/L      Alkaline Phosphatase 102 U/L      Total Protein 6 5 g/dL      Albumin 3 3 g/dL      Total Bilirubin 0 49 mg/dL      eGFR 106 ml/min/1 73sq m     Narrative:      National Kidney Disease Foundation guidelines for Chronic Kidney Disease (CKD):     Stage 1 with normal or high GFR (GFR > 90 mL/min/1 73 square meters)    Stage 2 Mild CKD (GFR = 60-89 mL/min/1 73 square meters)    Stage 3A Moderate CKD (GFR = 45-59 mL/min/1 73 square meters)    Stage 3B Moderate CKD (GFR = 30-44 mL/min/1 73 square meters)    Stage 4 Severe CKD (GFR = 15-29 mL/min/1 73 square meters)    Stage 5 End Stage CKD (GFR <15 mL/min/1 73 square meters)  Note: GFR calculation is accurate only with a steady state creatinine    Protime-INR [[id number]]  (Normal) Collected: 05/26/22 1128    Lab Status: Final result Specimen: Blood from Arm, Left Updated: 05/26/22 1151     Protime 14 1 seconds      INR 1 12    APTT [[id number]]  (Normal) Collected: 05/26/22 1128    Lab Status: Final result Specimen: Blood from Arm, Left Updated: 05/26/22 1151     PTT 28 seconds     Fingerstick Glucose (POCT) [[id number]]  (Normal) Collected: 05/26/22 1112    Lab Status: Final result Updated: 05/26/22 1145     POC Glucose 89 mg/dl     CBC and differential [[id number]]  (Abnormal) Collected: 05/26/22 1126    Lab Status: Final result Specimen: Blood from Hand, Left Updated: 05/26/22 1140     WBC 5 08 Thousand/uL      RBC 5 01 Million/uL      Hemoglobin 14 0 g/dL      Hematocrit 45 1 %      MCV 90 fL      MCH 27 9 pg      MCHC 31 0 g/dL      RDW 13 7 %      MPV 10 0 fL      Platelets 439 Thousands/uL      nRBC 0 /100 WBCs      Neutrophils Relative 70 %      Immat GRANS % 0 %      Lymphocytes Relative 17 %      Monocytes Relative 12 %      Eosinophils Relative 1 %      Basophils Relative 0 % Neutrophils Absolute 3 54 Thousands/µL      Immature Grans Absolute 0 00 Thousand/uL      Lymphocytes Absolute 0 85 Thousands/µL      Monocytes Absolute 0 61 Thousand/µL      Eosinophils Absolute 0 07 Thousand/µL      Basophils Absolute 0 01 Thousands/µL     Blood culture #2 [[id number]] Collected: 05/26/22 1128    Lab Status: In process Specimen: Blood from Hand, Left Updated: 05/26/22 1132    Blood culture #1 [[id number]] Collected: 05/26/22 1124    Lab Status: In process Specimen: Blood from Arm, Left Updated: 05/26/22 1132                 XR chest 1 view portable   ED Interpretation by Latoya Mandujano MD (05/26 1499)   Primary reviewed: no acute abnormality      CT chest wo contrast    (Results Pending)              Procedures  ECG 12 Lead Documentation Only    Date/Time: 5/26/2022 2:26 PM  Performed by: Latoya Mandujano MD  Authorized by: Latoya Mandujano MD     ECG reviewed by me, the ED Provider: yes    Patient location:  ED  Rate:     ECG rate:  54    ECG rate assessment: bradycardic    Rhythm:     Rhythm: sinus bradycardia    Ectopy:     Ectopy: none    QRS:     QRS axis:  Normal    QRS intervals:  Normal  Conduction:     Conduction: normal    ST segments:     ST segments:  Normal  T waves:     T waves: non-specific    Other findings:     Other findings: prolonged qTc interval               ED Course                               SBIRT 20yo+    Flowsheet Row Most Recent Value   SBIRT (23 yo +)    In order to provide better care to our patients, we are screening all of our patients for alcohol and drug use  Would it be okay to ask you these screening questions?  No Filed at: 05/26/2022 1150                    MDM  Number of Diagnoses or Management Options  Diagnosis management comments: Hypotension with benign exam-will do cardiac/septic workup, IV fluids, admit      Disposition  Final diagnoses:   Hypotension   Dehydration   Prolonged Q-T interval on ECG   Sinus bradycardia   Thrombocytopenia (HCC)     Time reflects when diagnosis was documented in both MDM as applicable and the Disposition within this note     Time User Action Codes Description Comment    5/26/2022  2:23 PM Jessica Dieter Add [I95 9] Hypotension     5/26/2022  2:23 PM Jessica Dieter Add [E86 0] Dehydration     5/26/2022  2:23 PM Rafael Vidal Add [R94 31] Prolonged Q-T interval on ECG     5/26/2022  2:23 PM Jessica Dieter Add [R00 1] Sinus bradycardia     5/26/2022  2:26 PM Rafael Vidal Add [D69 6] [facility])       ED Disposition     ED Disposition   Admit    Condition   Stable    Date/Time   Thu May 26, 2022  2:18 PM    Comment   Case was discussed with Dr Theresa Good and the patient's admission status was agreed to be Admission Status: inpatient status to the service of Dr Theresa Good   Admission Status: inpatient status         Follow-up Information    None         Patient's Medications   Discharge Prescriptions    No medications on file       No discharge procedures on file      PDMP Review     None          ED Provider  Electronically Signed by           Ivett Hunter MD  05/26/22 7768

## 2022-05-26 NOTE — ED NOTES
Елена Post, [phone] from [address] [place] [address], please call with updates        Merry Boast, RN  05/26/22 5786

## 2022-05-26 NOTE — QUICK NOTE
Progress Note - Triage Asssessment   Jone Ramos 39 y o  male MRN: [id number]    Time Called ( Time): 13:01  Date Called: 05/26/22  Room#: ED 29  Person requesting evaluation: Jaron Nix MD    Situation:    80-year-old male w/ PMHx of Intellectual Disability and Bipolar Disorder presents from [address]) due to lethargy, fatigue, and episode of diaphoresis  Caregiver Martha Gonzalez) is at bedside and providing additional information  She states that the patient was getting ready for the day's activities when he became fatigued, pale, and was sweating  He had eaten breakfast (consists of thickened liquids)  Pt was brought to ED for further evaluation  In ED, pt was found to be dehydrated as evidenced by dry mucous membranes and was hypotensive at 82/47  Additionally, he was bradycardic, but was saturating well on RA  His infectious workup so far as been negative as evidenced by normal lactic acid, negative initial troponin, CBC and CMP largely unremarkable with no leukocytosis  Upon my physical exam, the pt does appear dehydrated and is skinny  He is receiving his 3rd liter of 0 9% NSS and his BP has improved to 100/69 when I evaluated him  Pt continues to deny having any symptoms of CP, HA, dizziness, N/D, nor lethargy  He was previously seen at [facility] last month where his BP was also soft  His caregiver states that he has had extensive workup to identify why the pt is eating, but still losing weight  Interventions:   No interventions by Critical Care were performed at this time  Triage Assessment:     Appears dehydrated - BP has improved to 109/78 after receiving 3L of 0 9% NSS  Pt is asymptomatic and has no complaints  Infectious work up so far is negative  Imaging obtained, but pending final read - though appears negative for acute cardiopulmonary pathology per my evaluation      Discussed with Dr Amita Ferguson of [address] and patient deemed not appropriate for ICU level of care at this time  Patient to be admitted to step down level of care - specifically Step Down Level 2      Recommendations discussed with Jennifer Chandler MD

## 2022-05-26 NOTE — ASSESSMENT & PLAN NOTE
History of ambulatory dysfunction setting of moderate intellectual disability  Currently on carbidopa levodopa per his [facility] neurology  Reported improvement by his caretaker  Continue for now

## 2022-05-27 ENCOUNTER — APPOINTMENT (INPATIENT)
Dept: NON INVASIVE DIAGNOSTICS | Facility: HOSPITAL | Age: 42
DRG: 315 | End: 2022-05-27
Payer: MEDICARE

## 2022-05-27 VITALS
SYSTOLIC BLOOD PRESSURE: 130 MMHG | HEIGHT: 67 IN | RESPIRATION RATE: 20 BRPM | WEIGHT: 114 LBS | BODY MASS INDEX: 17.89 KG/M2 | DIASTOLIC BLOOD PRESSURE: 84 MMHG | OXYGEN SATURATION: 99 % | TEMPERATURE: 98.2 F | HEART RATE: 65 BPM

## 2022-05-27 PROBLEM — D69.6 THROMBOCYTOPENIA (HCC): Status: ACTIVE | Noted: 2022-05-27

## 2022-05-27 LAB
ANION GAP SERPL CALCULATED.3IONS-SCNC: 4 MMOL/L (ref 4–13)
AORTIC ROOT: 2.3 CM
APICAL FOUR CHAMBER EJECTION FRACTION: 55 %
ASCENDING AORTA: 2.5 CM
BUN SERPL-MCNC: 10 MG/DL (ref 5–25)
CALCIUM SERPL-MCNC: 8.7 MG/DL (ref 8.3–10.1)
CHLORIDE SERPL-SCNC: 108 MMOL/L (ref 100–108)
CO2 SERPL-SCNC: 30 MMOL/L (ref 21–32)
CREAT SERPL-MCNC: 0.75 MG/DL (ref 0.6–1.3)
E WAVE DECELERATION TIME: 227 MS
ERYTHROCYTE [DISTWIDTH] IN BLOOD BY AUTOMATED COUNT: 13.7 % (ref 11.6–15.1)
FRACTIONAL SHORTENING: 33 % (ref 28–44)
GFR SERPL CREATININE-BSD FRML MDRD: 113 ML/MIN/1.73SQ M
GLUCOSE SERPL-MCNC: 79 MG/DL (ref 65–140)
HCT VFR BLD AUTO: 44.6 % (ref 36.5–49.3)
HGB BLD-MCNC: 13.9 G/DL (ref 12–17)
INTERVENTRICULAR SEPTUM IN DIASTOLE (PARASTERNAL SHORT AXIS VIEW): 0.6 CM
INTERVENTRICULAR SEPTUM: 0.6 CM (ref 0.6–1.1)
LAAS-AP2: 8.5 CM2
LAAS-AP4: 10.2 CM2
LEFT ATRIUM SIZE: 2 CM
LEFT INTERNAL DIMENSION IN SYSTOLE: 3 CM (ref 2.1–4)
LEFT VENTRICULAR INTERNAL DIMENSION IN DIASTOLE: 4.5 CM (ref 3.5–6)
LEFT VENTRICULAR POSTERIOR WALL IN END DIASTOLE: 0.6 CM
LEFT VENTRICULAR STROKE VOLUME: 56 ML
LVSV (TEICH): 56 ML
MCH RBC QN AUTO: 27.8 PG (ref 26.8–34.3)
MCHC RBC AUTO-ENTMCNC: 31.2 G/DL (ref 31.4–37.4)
MCV RBC AUTO: 89 FL (ref 82–98)
MV E'TISSUE VEL-SEP: 11 CM/S
MV PEAK A VEL: 0.79 M/S
MV PEAK E VEL: 72 CM/S
MV STENOSIS PRESSURE HALF TIME: 66 MS
MV VALVE AREA P 1/2 METHOD: 3.33 CM2
PLATELET # BLD AUTO: 94 THOUSANDS/UL (ref 149–390)
PMV BLD AUTO: 10.6 FL (ref 8.9–12.7)
POTASSIUM SERPL-SCNC: 4.7 MMOL/L (ref 3.5–5.3)
RBC # BLD AUTO: 5 MILLION/UL (ref 3.88–5.62)
RIGHT ATRIUM AREA SYSTOLE A4C: 11.5 CM2
RIGHT VENTRICLE ID DIMENSION: 3 CM
SL CV LEFT ATRIUM LENGTH A2C: 2.9 CM
SL CV LV EF: 55
SL CV PED ECHO LEFT VENTRICLE DIASTOLIC VOLUME (MOD BIPLANE) 2D: 90 ML
SL CV PED ECHO LEFT VENTRICLE SYSTOLIC VOLUME (MOD BIPLANE) 2D: 34 ML
SODIUM SERPL-SCNC: 142 MMOL/L (ref 136–145)
TRICUSPID ANNULAR PLANE SYSTOLIC EXCURSION: 2 CM
WBC # BLD AUTO: 3.51 THOUSAND/UL (ref 4.31–10.16)

## 2022-05-27 PROCEDURE — 93306 TTE W/DOPPLER COMPLETE: CPT

## 2022-05-27 PROCEDURE — 97163 PT EVAL HIGH COMPLEX 45 MIN: CPT

## 2022-05-27 PROCEDURE — 97167 OT EVAL HIGH COMPLEX 60 MIN: CPT

## 2022-05-27 PROCEDURE — 99239 HOSP IP/OBS DSCHRG MGMT >30: CPT | Performed by: STUDENT IN AN ORGANIZED HEALTH CARE EDUCATION/TRAINING PROGRAM

## 2022-05-27 PROCEDURE — 85027 COMPLETE CBC AUTOMATED: CPT | Performed by: INTERNAL MEDICINE

## 2022-05-27 PROCEDURE — 93306 TTE W/DOPPLER COMPLETE: CPT | Performed by: INTERNAL MEDICINE

## 2022-05-27 PROCEDURE — 80048 BASIC METABOLIC PNL TOTAL CA: CPT | Performed by: INTERNAL MEDICINE

## 2022-05-27 RX ADMIN — CARBIDOPA AND LEVODOPA 1.5 TABLET: 25; 100 TABLET ORAL at 08:09

## 2022-05-27 RX ADMIN — BENZTROPINE MESYLATE 1 MG: 1 TABLET ORAL at 08:09

## 2022-05-27 RX ADMIN — SODIUM CHLORIDE 125 ML/HR: 0.9 INJECTION, SOLUTION INTRAVENOUS at 00:05

## 2022-05-27 RX ADMIN — BENZTROPINE MESYLATE 1 MG: 1 TABLET ORAL at 16:06

## 2022-05-27 RX ADMIN — CARBIDOPA AND LEVODOPA 1.5 TABLET: 25; 100 TABLET ORAL at 16:06

## 2022-05-27 RX ADMIN — DOCUSATE SODIUM 100 MG: 100 CAPSULE, LIQUID FILLED ORAL at 08:09

## 2022-05-27 RX ADMIN — PANTOPRAZOLE SODIUM 20 MG: 20 TABLET, DELAYED RELEASE ORAL at 05:43

## 2022-05-27 RX ADMIN — DOCUSATE SODIUM 100 MG: 100 CAPSULE, LIQUID FILLED ORAL at 16:06

## 2022-05-27 RX ADMIN — CITALOPRAM HYDROBROMIDE 20 MG: 20 TABLET ORAL at 08:09

## 2022-05-27 NOTE — CASE MANAGEMENT
Case Management Assessment & Discharge Planning Note    Patient name Zohreh Skinner [facility]  Location [place] [address] [address] /[address] Reyna Stapleton* MRN [id number]  : 1980 Date 2022       Current Admission Date: 2022  Current Admission Diagnosis:Hypotension   Patient Active Problem List    Diagnosis Date Noted    Hypotension 2022    Weight loss 2022    Acute sore throat 2021    Sore throat 2021    Bipolar disorder ([address] ) 2021    Ambulatory dysfunction 2021    Dysphagia 2021    Falls 2021    Glaucoma 2021    Closed nondisplaced intertrochanteric fracture of right femur with routine healing 10/31/2021      LOS (days): 1  Geometric Mean LOS (GMLOS) (days): 1 90  Days to GMLOS:1 1     OBJECTIVE:    Risk of Unplanned Readmission Score: 7 26         Current admission status: Inpatient       Preferred Pharmacy:   [address] [place] [address] [employer] [address] [address]  Phone: [phone] Fax: [phone]    Primary Care Provider: Javed Pardo MD    Primary Insurance: MEDICARE  Secondary Insurance: BLUE CROSS    ASSESSMENT:  Rianna [facility] [employer]     [facility], [address] [place] [address] Phone: [phone] (Mobile)               Advance Directives  Does patient have a [address] [place] [address] Avenue?: Yes (Per [address] Supervisor he is POA)  Primary Contact: Kimberlyn Montez [phone]         Readmission Root Cause  30 Day Readmission: No    Patient Information  Admitted from[de-identified] Home  Mental Status: Alert (Has moderate ID)  During Assessment patient was accompanied by: Not accompanied during assessment  Assessment information provided by[de-identified] Other - please comment ( [address])  Primary Caregiver:  Other (Comment) (Group Home Staff)  Caregiver's Name[de-identified] [facility] Staff-  Caregiver's Relationship to Patient[de-identified] Other (Specify) Kin Barillas Program Nurse)  Caregiver's Telephone Number[de-identified] [phone]: Parent, Organized support group (Comment) ([facility] Staff)  [place] of Residence: [address] [facility] do you live in?: [place] (moved last week)  Type of Current Residence: Group home  Upon entering residence, is there a bedroom on the main floor (no further steps)?: Yes  Upon entering residence, is there a bathroom on the main floor (no further steps)?: Yes  In the last 12 months, was there a time when you were not able to pay the mortgage or rent on time?: No  In the last 12 months, how many places have you lived?: 2  Living Arrangements: Other (Comment) (Access [address] Staff)    Activities of Daily Living Prior to Admission  Functional Status: Assistance  Completes ADLs independently?: No  Level of ADL dependence: Assistance (is able to participate however staff does most of ADL's- pt able to feed self and brush teeth)  Ambulates independently?: Yes  Does patient use assisted devices?: No  Does patient currently own DME?: Yes  What DME does the patient currently own?: Nebulizer  Does patient have a history of Outpatient Therapy (PT/OT)?: No  Does the patient have a history of Short-Term Rehab?: No  Does patient have a history of HHC?: No  Does patient currently have [facility]?: No         Patient Information Continued  Income Source: SSI/SSD  Does patient have prescription coverage?: Yes (Group home uses [employer])  Within the past 12 months, you worried that your food would run out before you got the money to buy more : Never true  Does patient have a history of substance abuse?: No  Does patient have a history of Mental Health Diagnosis?: Yes (h/o Bipolar)  Is patient receiving treatment for mental health?: Yes (maintained on meds and stable)  Has patient received inpatient treatment related to mental health in the last 2 years?: No         Means of Transportation  Means of Transport to Appts[de-identified] Other (Comment) (group home provides transport and will do so on d/c- need 24 hrs notice to arrange)  In the past 12 months, has lack of transportation kept you from medical appointments or from getting medications?: No        DISCHARGE DETAILS:    Discharge planning discussed with[de-identified] Jose Manuel Merida- [facility]         CM contacted family/caregiver?: Yes                                      Treatment Team Recommendation:  (TBD pending PT/OT evals)

## 2022-05-27 NOTE — DISCHARGE SUMMARY
[address] [facility]  Discharge- Boyd Minor 1980, 39 y o  male MRN: [id number]  Unit/Bed#: [facility] 87 225-01 Encounter: [id number]  Primary Care Provider: Reggie Heart MD   Date and time admitted to hospital: 5/26/2022 11:11 AM    Thrombocytopenia (HCC)  Assessment & Plan  Appears chronic, no evidence of bleeding    Weight loss  Assessment & Plan  No obvious source  Encourage increased po intake, continue ST evaluation outpatient for dysphagia  Recommend age appropriate cancer screenings, to follow up with PCP outpatient    Glaucoma  Assessment & Plan  Continue combigan eye drops    Dysphagia  Assessment & Plan  History of dysphagia on nectar thick liquids  Continue dysphagia diet, follow up with ST outpatient    Ambulatory dysfunction  Assessment & Plan  History of ambulatory dysfunction setting of moderate intellectual disability  Currently on carbidopa levodopa per his [facility] neurology  Continue home sinemet dose    Bipolar disorder ([facility] [address] )  Assessment & Plan  Mood is stable, maintained on Celexa  No longer on Depakote, depakote level negative    * Hypotension  Assessment & Plan  Patient with unexplained hypotension, bradycardia  No infectious source, pro aracely neg, discontinue abx  Recently started on carbidopa levodopa for gait instability and tremors  CT chest without acute processes  2D echo was within normal limits, normal EF, no pericardial effusion  BP improved after 3L normal saline      Discharging Physician / Practitioner: Clive Garcia MD  PCP: Reggie Heart MD  Admission Date:   Admission Orders (From admission, onward)     Ordered        05/26/22 1430  INPATIENT ADMISSION  Once                      Discharge Date: 05/27/22    Medical Problems             Resolved Problems  Date Reviewed: 5/27/2022   None                 Consultations During Hospital Stay:  · None    Procedures Performed:   · None    Significant Findings / Test Results:   XR chest 1 view portable    Result Date: 5/26/2022  Impression: No acute cardiopulmonary disease  Workstation performed: [id number]     CT chest wo contrast    Result Date: 5/26/2022  · Impression: No acute pulmonary disease  Upper abdomen grossly normal but compromised by motion  Workstation performed: [id number]   ·     Incidental Findings:   · None     Test Results Pending at Discharge (will require follow up): · None     Outpatient Tests Requested:  · None    Complications:  None    Reason for Admission: Hypotension    Hospital Course:     Geovany Ann is a 39 y o  male patient who originally presented to the hospital on 5/26/2022 due to hypotension, reports of weakness  Was reported that patient prior to admission was on day trip outside for a hike  The that has become progressively weak, had difficulty ambulating  Dad reports that patient has lost 20 weeks in the past few months, 40 lb in the past year  He did have recent worsening movement disorder and had been following neurology outpatient starting carbidopa levodopa with improvement  His labs were unremarkable, 2D echo ordered showed normal EF with no valvular abnormality noted  His blood pressure did improve with IV fluids, given 3 L of NS  There is reports regarding patient having dysphagia, recommends a follow-up with speech therapy outpatient, continue  Patient did tolerate pureed diet well in house  Patient discharge back to group home  Would recommend to monitor patient's p o  Intake, follow-up PCP regarding cancer screening given weight loss, which may be unintentional though given patient's history of intellectual disability, difficult to get a proper history  Discharged earlier than anticipated as patient BP improved, tolerated diet and was able to work with PT/OT  Please see above list of diagnoses and related plan for additional information       Condition at Discharge: fair     Discharge Day Visit / Exam:     Subjective:  Patient reports doing well today  Denies any chest pains, fevers, cough, dysuria, nausea or vomiting  Reports that he had been tolerating diet  Father at bedside, all questions answered  Vitals: Blood Pressure: 130/84 (05/27/22 1008)  Pulse: 65 (05/27/22 1001)  Temperature: 98 2 °F (36 8 °C) (05/27/22 0748)  Temp Source: Axillary (05/26/22 2330)  Respirations: 20 (05/27/22 0748)  Height: 5' 7" (170 2 cm) (05/27/22 1001)  Weight - Scale: 51 7 kg (114 lb) (05/27/22 1001)  SpO2: 99 % (05/27/22 1001)  Exam:   Physical Exam  Vitals and nursing note reviewed  Constitutional:       Appearance: Normal appearance  He is normal weight  HENT:      Head: Normocephalic and atraumatic  Eyes:      General: No scleral icterus  Conjunctiva/sclera: Conjunctivae normal    Cardiovascular:      Rate and Rhythm: Normal rate and regular rhythm  Heart sounds: Normal heart sounds  Pulmonary:      Breath sounds: Normal breath sounds  No wheezing or rhonchi  Abdominal:      General: Bowel sounds are normal  There is no distension  Palpations: Abdomen is soft  Tenderness: There is no abdominal tenderness  Musculoskeletal:         General: No swelling  Right lower leg: No edema  Left lower leg: No edema  Skin:     General: Skin is warm and dry  Neurological:      General: No focal deficit present  Mental Status: He is alert  Mental status is at baseline  Discussion with Family: patient, father at bedside    Discharge instructions/Information to patient and family:   See after visit summary for information provided to patient and family  Provisions for Follow-Up Care:  See after visit summary for information related to follow-up care and any pertinent home health orders  Disposition:     Home    Planned Readmission: none     Discharge Statement:  I spent 35 minutes discharging the patient  This time was spent on the day of discharge  I had direct contact with the patient on the day of discharge  Greater than 50% of the total time was spent examining patient, answering all patient questions, arranging and discussing plan of care with patient as well as directly providing post-discharge instructions  Additional time then spent on discharge activities  Discharge Medications:  See after visit summary for reconciled discharge medications provided to patient and family        ** Please Note: This note has been constructed using a voice recognition system **

## 2022-05-27 NOTE — PLAN OF CARE
Problem: OCCUPATIONAL THERAPY ADULT  Goal: Performs self-care activities at highest level of function for planned discharge setting  See evaluation for individualized goals  Description: Treatment Interventions: ADL retraining, Functional transfer training, Endurance training, Patient/family training, Equipment evaluation/education          See flowsheet documentation for full assessment, interventions and recommendations  Outcome: Progressing  Note: Limitation: Decreased ADL status, Decreased cognition, Decreased endurance     Assessment: Pt is a 39 y o  male seen for OT evaluation s/p admit to [facility] on 5/26/2022 w/ Hypotension  Comorbidities affecting pt's functional performance at time of assessment include: underlying neurological disorder, limited communication, previous surgery, limited cognition and CP and moderate intellectual disability  Personal factors affecting pt at time of IE include:difficulty performing ADLS, difficulty performing IADLS  and limited insight into deficits  Prior to admission, pt was Minimal Assistance for ADLs and per report from group home he was independent for ambulation without AD  Upon evaluation: Pt requires Minimal Assistance x1 person assist for hand held ambulation, and Moderate Assistance for some ADLs 2* the following deficits impacting occupational performance: decreased strength, decreased balance, decreased tolerance, impaired attention, impaired initiation and impaired problem solving  Pt to benefit from continued skilled OT tx while in the hospital to address deficits as defined above and maximize level of functional independence w ADL's and functional mobility  Occupational Performance areas to address include: grooming, bathing/shower, toilet hygiene, dressing and functional mobility  From OT standpoint, recommendation at time of d/c would be home with [address] [place] [address], pending group [facility]'s ability to provide required level of assistance       OT Discharge Recommendation: Home with home health rehabilitation  OT - OK to Discharge:  (yes, once medically cleared)     Odin Banerjee, OTR/L

## 2022-05-27 NOTE — CASE MANAGEMENT
Case Management Discharge Planning Note    Patient name Lore Melgar MEIR [facility]  Location [facility] 2 /[facility] 2 Fredi Kat* MRN [id number]  : 1980 Date 2022       Current Admission Date: 2022  Current Admission Diagnosis:Hypotension   Patient Active Problem List    Diagnosis Date Noted    Hypotension 2022    Weight loss 2022    Acute sore throat 2021    Sore throat 2021    Bipolar disorder ([facility] [address] ) 2021    Ambulatory dysfunction 2021    Dysphagia 2021    Falls 2021    Glaucoma 2021    Closed nondisplaced intertrochanteric fracture of right femur with routine healing 10/31/2021      LOS (days): 1  Geometric Mean LOS (GMLOS) (days): 1 90  Days to GMLOS:1     OBJECTIVE:  Risk of Unplanned Readmission Score: 7 26         Current admission status: Inpatient   Preferred Pharmacy:   [address] [place] [address], [address] [place] [address]  Phone: [phone] Fax: [phone]    Primary Care Provider: Jossue Jimenez MD    Primary Insurance: MEDICARE  Secondary Insurance: BLUE CROSS    DISCHARGE DETAILS:    Discharge planning discussed with[de-identified] Venecia Zavala [facility]         CM contacted family/caregiver?: Yes    Treatment Team Recommendation: Home  Discharge Destination Plan[de-identified] Home  Transport at Discharge : Auto with designated         Transported by [employer] and Unit #): [facility]  ETA of Transport (Date): 22  ETA of Transport (Time): 1700

## 2022-05-27 NOTE — PLAN OF CARE
Problem: PHYSICAL THERAPY ADULT  Goal: Performs mobility at highest level of function for planned discharge setting  See evaluation for individualized goals  Description: Treatment/Interventions: Functional transfer training, LE strengthening/ROM, Elevations, Therapeutic exercise, Endurance training, Patient/family training, Equipment eval/education, Bed mobility, Gait training, Compensatory technique education, Continued evaluation, Spoke to nursing, OT, Spoke to case management          See flowsheet documentation for full assessment, interventions and recommendations  Outcome: Progressing  Note: Prognosis: Fair  Problem List: Decreased strength, Decreased endurance, Impaired balance, Decreased mobility, Decreased coordination, Impaired judgement, Decreased safety awareness, Decreased cognition  Assessment: Surehs Goel is a 39 y o  male who presents with weakness, diaphoresis, and hypotension  Patient has a history of moderate intellectual disabilities as well as bipolar disorder  Hx of R femur ORIF p fx  Progressive weakness while on day trip  + 20 lb weight loss over several months  He was recently started on carbidopa levodopa for 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 which has improved his gait  Admitted with hypotension, bradycardia, dehydration  Prior to admission resides in group home  Per CM:  Ambulates independently without AD use  A for ADLS and IADLs needed  Currently presents with functional limitations related to cognition, decreased activity tolerance, balance, general strength, functional mobility and locomotion  Requires min A for bed mobility, transfers and ambulation with hand held assistance  Increased time and cues needed for mobility  Risk for falls given impairments  Will benefit from skilled PT in order to progress and optimize outcomes  The patient's AM-PAC Basic Mobility Inpatient Short Form Raw Score is 17   A Raw score of greater than 16 suggests the patient may benefit from discharge to home  Please also refer to the recommendation of the Physical Therapist for safe discharge planning  Anticipate ability to return to group home with assistance of staff and continued PT in order to optimize balance and functional mobility  PT Discharge Recommendation: Return to facility with rehabilitation services (provided group home able to provide level of assist )          See flowsheet documentation for full assessment

## 2022-05-27 NOTE — OCCUPATIONAL THERAPY NOTE
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     Oppositional defiant disorder     Oppositional defiant disorder     Psychiatric disorder     Seasonal allergies     Thoracic scoliosis         05/27/22 1016   OT Last Visit   OT Visit Date 05/27/22   Note Type   Note type Evaluation   Restrictions/Precautions   Weight Bearing Precautions Per Order No   Other Precautions Cognitive; Chair Alarm; Bed Alarm;Multiple lines; Fall Risk   Pain Assessment   Pain Score No Pain   Home Living   Type of Home Group Home   Home Equipment   (none reported)   Additional Comments pt resides in group home   Prior Function   Level of [place] Needs assistance with ADLs and functional mobility; Needs assistance with IADLs   Lives With Facility staff   Receives Help From Personal care attendant   ADL Assistance Needs assistance  (per report, able to perform some basic self care)   IADLs Needs assistance   Falls in the last 6 months 1 to 4  (hx of fall wtih femur fx in 2021)   Comments Per CM- facility reports the pt ambulates independently without AD, supervision for ADLs   Psychosocial   Psychosocial (WDL) X   Patient Behaviors/Mood Brightens with approach   ADL   Eating Assistance 4  Minimal Assistance   Eating Deficit Setup;Supervision/safety; Increased time to complete;Bringing food to mouth assist   Grooming Assistance 5  Supervision/Setup   Grooming Deficit Setup;Supervision/safety; Wash/dry face  (cues for thoroughness)   UB Dressing Assistance 3  Moderate Assistance   UB Dressing Deficit   (to wojciech new gown)   LB Dressing Assistance 3  Moderate Assistance   LB Dressing Deficit Don/doff R shoe;Don/doff L shoe   Toileting Assistance    (pt wearing brief)   Bed Mobility   Supine to Sit 4  Minimal assistance   Additional items Assist x 1;HOB elevated; Increased time required;Verbal cues   Transfers   Sit to Stand 4  Minimal assistance   Additional items Assist x 1; Increased time required;Verbal cues   Stand to Sit 4  Minimal assistance   Additional items Assist x 1; Increased time required;Verbal cues   Stand pivot 4  Minimal assistance   Additional items Assist x 1; Increased time required;Verbal cues   Activity Tolerance   Activity Tolerance Patient tolerated treatment well   Medical Staff Made Aware Victorino Riggins PT   Nurse Made Aware Mahendra Amato RN   RUE Assessment   RUE Assessment WFL   LUE Assessment   LUE Assessment WFL   Hand Function   Gross Motor Coordination Functional   Fine Motor Coordination   (bradykinetic, but grossly WFL)   Sensation   Light Touch No apparent deficits   Vision-Basic Assessment   Current Vision Does not wear glasses   Visual History Glaucoma   Vision - Complex Assessment   Tracking Able to track stimulus in all quads without difficulty   Acuity   [facility] for mobility)   Cognition   Overall Cognitive Status Impaired   Arousal/Participation Alert; Responsive   Attention Attends with cues to redirect   Orientation Level Oriented to person  (responds to name)   Memory Decreased recall of recent events;Decreased recall of precautions   Following Commands Follows one step commands with increased time or repetition   Comments Pt pleasant and cooperative  Impaired divided attention  Some automatic verbal responses  Assessment   Limitation Decreased ADL status; Decreased cognition;Decreased endurance   Assessment Pt is a 39 y o  male seen for OT evaluation s/p admit to [facility] on 5/26/2022 w/ Hypotension   Comorbidities affecting pt's functional performance at time of assessment include: underlying neurological disorder, limited communication, previous surgery, limited cognition and CP and moderate intellectual disability  Personal factors affecting pt at time of IE include:difficulty performing ADLS, difficulty performing IADLS  and limited insight into deficits  Prior to admission, pt was Minimal Assistance for ADLs and per report from group home he was independent for ambulation without AD  Upon evaluation: Pt requires Minimal Assistance x1 person assist for hand held ambulation, and Moderate Assistance for some ADLs 2* the following deficits impacting occupational performance: decreased strength, decreased balance, decreased tolerance, impaired attention, impaired initiation and impaired problem solving  Pt to benefit from continued skilled OT tx while in the hospital to address deficits as defined above and maximize level of functional independence w ADL's and functional mobility  Occupational Performance areas to address include: grooming, bathing/shower, toilet hygiene, dressing and functional mobility  From OT standpoint, recommendation at time of d/c would be home with [address] [place] [address], pending group home's ability to provide required level of assistance  Goals   Patient Goals to watch tv   Plan   Treatment Interventions ADL retraining;Functional transfer training; Endurance training;Patient/family training;Equipment evaluation/education   Goal Expiration Date 06/10/22   OT Treatment Day 0   OT Frequency 2-3x/wk   Recommendation   OT Discharge Recommendation Home with home health rehabilitation   OT - OK to Discharge   (yes, once medically cleared)   Additional Comments  The patient's raw score on the AM-PAC Daily Activity inpatient short form is 16, standardized score is 35 96, less than 39 4  Patients at this level are likely to benefit from discharge to post-acute rehabilitation services   Please refer to the recommendation of the Occupational Therapist for safe discharge planning     AM-PAC Daily Activity Inpatient   Lower Body Dressing 2   Bathing 3   Toileting 2   Upper Body Dressing 3   Grooming 3   Eating 3   Daily Activity Raw Score 16   Daily Activity Standardized Score (Calc for Raw Score >=11) 35 96   AM-PAC Applied Cognition Inpatient   Following a Speech/Presentation 2   Understanding Ordinary Conversation 2   Taking Medications 1   Remembering Where Things Are Placed or Put Away 2   Remembering List of 4-5 Errands 1   Taking Care of Complicated Tasks 1   Applied Cognition Raw Score 9   Applied Cognition Standardized Score 22 48       Goals: to be met by 6/10/22    Patient will perform functional bed mobility with Standby Assistance, with HOB flat, no rails  Patient will perform functional transfers with [address] in preparation for ADL tasks, with good safety awareness  Patient will perform UB dressing task with [address] while seated, with cues prn  Patient will perform LB dressing task with Minimal Assistance  Patient will perform toilet transfer with [address]  Patient will perform toileting with [address], including hygiene and clothing management   Patient will improve activity tolerance by participating in 20 minutes of session at a time in preparation for participation in ADL tasks    Avila Sharp, OTR/L

## 2022-05-27 NOTE — PHYSICAL THERAPY NOTE
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     Oppositional defiant disorder     Oppositional defiant disorder     Psychiatric disorder     Seasonal allergies     Thoracic scoliosis          No past surgical history on file  05/27/22 0950   PT Last Visit   PT Visit Date 05/27/22   Note Type   Note type Evaluation   Pain Assessment   Pain Score No Pain   Restrictions/Precautions   Weight Bearing Precautions Per Order No   Other Precautions Cognitive; Chair Alarm; Bed Alarm;Multiple lines; Fall Risk   Home Living   Type of [place]   (none reported)   Additional Comments resides in group home  Prior Function   Level of [place] Independent with ADLs and functional mobility; Needs assistance with ADLs and functional mobility; Needs assistance with IADLs   Lives With Facility staff   Receives Help From Personal care attendant   ADL Assistance Needs assistance   IADLs Needs assistance   Falls in the last 6 months 1 to 4  (hx of femur fx 2021)   Comments Per CM-facility reports ambulation independently without AD use  A/guidance for ADLS  General   Additional Pertinent History Admitted with hypotension, bradycardia  Likely dehydration   Family/Caregiver Present No   Cognition   Overall Cognitive Status Impaired   Arousal/Participation Alert   Orientation Level Oriented to person   Following Commands Follows one step commands with increased time or repetition   Comments Pleasant  Cooperative     Subjective   Subjective "Hey what u doing?" Pleasant  RUE Assessment   RUE Assessment WFL  (grossly at least 3/5 observed with function)   LUE Assessment   LUE Assessment WFL  (grossly 3/5 observed with function )   RLE Assessment   RLE Assessment WFL  (grossly at least 3/5)   LLE Assessment   LLE Assessment WFL  (grossly at least 3/5)   Coordination   Movements are Fluid and Coordinated 0   Coordination and Movement Description ataxia   Bed Mobility   Supine to Sit 4  Minimal assistance   Additional items Assist x 1; Increased time required;Verbal cues;HOB elevated; Bedrails   Additional Comments Increased time to complete with cues/guidance  Transfers   Sit to Stand 4  Minimal assistance   Additional items Assist x 1; Increased time required;Verbal cues   Stand to Sit 4  Minimal assistance   Additional items Assist x 1; Increased time required;Verbal cues   Stand pivot 4  Minimal assistance   Additional items Assist x 1; Increased time required;Verbal cues   Additional Comments Increased time to transition, cues for safety  Ambulation/Elevation   Gait pattern Decreased foot clearance; Improper Weight shift; Inconsistent omid; Short stride; Ataxia;Narrow CATY   Gait Assistance 4  Minimal assist   Additional items Assist x 1;Verbal cues; Tactile cues  (2nd person standby for safety )   Assistive Device Other (Comment)  (hand held assistance )   Distance Amb with hand held assistance 50'x2  Ambulation/Elevation Additional Comments Inconsistent omid, increased cues for direction, focus on tasks  Balance   Static Sitting Fair +   Dynamic Sitting Fair   Static Standing Fair -   Dynamic Standing Poor +   Ambulatory Poor +   Endurance Deficit   Endurance Deficit Yes   Endurance Deficit Description fatigue  BP stable  Supine: 127/78  P ambulation 130/84   Activity Tolerance   Activity Tolerance Patient tolerated treatment well;Patient limited by fatigue   Medical Staff Made Aware Nurse, Oriana Yee    [facility]   Nurse Made Aware yes   Assessment   Prognosis Fair Problem List Decreased strength;Decreased endurance; Impaired balance;Decreased mobility; Decreased coordination; Impaired judgement;Decreased safety awareness;Decreased cognition   Assessment Suresh Goel is a 39 y o  male who presents with weakness, diaphoresis, and hypotension  Patient has a history of moderate intellectual disabilities as well as bipolar disorder  Hx of R femur ORIF p fx  Progressive weakness while on day trip  + 20 lb weight loss over several months  He was recently started on carbidopa levodopa for 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 which has improved his gait  Admitted with hypotension, bradycardia, dehydration  Prior to admission resides in group home  Per CM:  Ambulates independently without AD use  A for ADLS and IADLs needed  Currently presents with functional limitations related to cognition, decreased activity tolerance, balance, general strength, functional mobility and locomotion  Requires min A for bed mobility, transfers and ambulation with hand held assistance  Increased time and cues needed for mobility  Risk for falls given impairments  Will benefit from skilled PT in order to progress and optimize outcomes  The patient's AM-PAC Basic Mobility Inpatient Short Form Raw Score is 17  A Raw score of greater than 16 suggests the patient may benefit from discharge to home  Please also refer to the recommendation of the Physical Therapist for safe discharge planning  Anticipate ability to return to group home with assistance of staff and continued PT in order to optimize balance and functional mobility  Goals   Patient Goals watch tv   STG Expiration Date 06/05/22   Short Term Goal #1 10 days: 1)  Pt will perform bed mobility with Gamaliel demonstrating appropriate technique 100% of the time in order to improve function  2)  Perform all transfers with Gamaliel demonstrating safe and appropriate technique 100% of the time in order to improve ability to negotiate safely in home environment  3) Amb with least restrictive AD prn > 150'x1 with mod I in order to demonstrate ability to negotiate in home environment  4)  Improve overall strength and balance 1/2 grade in order to optimize ability to perform functional tasks and reduce fall risk  5) Increase activity tolerance to 45 minutes in order to improve endurance to functional tasks  6)  Negotiate stairs as needed using most appropriate technique and S in order to be able to negotiate safely in home environment  7) PT for ongoing patient and family/caregiver education, DME needs and d/c planning in order to promote highest level of function in least restrictive environment  Plan   Treatment/Interventions Functional transfer training;LE strengthening/ROM; Elevations; Therapeutic exercise; Endurance training;Patient/family training;Equipment eval/education; Bed mobility;Gait training; Compensatory technique education;Continued evaluation;Spoke to nursing;OT;Spoke to case management   PT Frequency 3-5x/wk   Recommendation   PT Discharge Recommendation Return to facility with rehabilitation services  (provided group home able to provide level of assist )   AM-PAC Basic Mobility Inpatient   Turning in Bed Without Bedrails 3   Lying on Back to Sitting on Edge of Flat Bed 3   Moving Bed to Chair 3   Standing Up From Chair 3   Walk in Room 3   Climb 3-5 Stairs 2   Basic Mobility Inpatient Raw Score 17   Basic Mobility Standardized Score 39 67   Highest Level Of Mobility   JH-HLM Goal 5: Stand one or more mins   JH-HLM Achieved 7: Walk 25 feet or more   End of Consult   Patient Position at End of Consult Bedside chair;Bed/Chair alarm activated; All needs within reach   End of Consult Comments vitals stable   130/84     Hx/personal factors: co-morbidities, dec cognition, h/o of falls, fall risk, and assist w/ ADL's  Examination: dec mobility, dec balance, dec endurance, dec amb, risk for falls, dec cognition, assessed body system, balance, endurance, amb, D/C disposition & fall risk, impairements in locomotion, musculoskeletal, balance, endurance, posture, coordination  Clinical: unpredictable (ongoing medical status and risk for falls), cognition, bed/chair alarm, continuous monitoring on Masimo    Complexity: high        Teofilo Gil, PT

## 2022-05-27 NOTE — ASSESSMENT & PLAN NOTE
No obvious source  Encourage increased po intake, continue ST evaluation outpatient for dysphagia  Recommend age appropriate cancer screenings, to follow up with PCP outpatient

## 2022-05-27 NOTE — ASSESSMENT & PLAN NOTE
History of ambulatory dysfunction setting of moderate intellectual disability  Currently on carbidopa levodopa per his [facility] neurology  Continue home sinemet dose

## 2022-05-27 NOTE — ASSESSMENT & PLAN NOTE
Patient with unexplained hypotension, bradycardia  No infectious source, pro aracely neg, discontinue abx  Recently started on carbidopa levodopa for gait instability and tremors  CT chest without acute processes  2D echo was within normal limits, normal EF, no pericardial effusion  BP improved after 3L normal saline

## 2022-05-27 NOTE — PLAN OF CARE
Problem: Potential for Falls  Goal: Patient will remain free of falls  Description: INTERVENTIONS:  - Educate patient/family on patient safety including physical limitations  - Instruct patient to call for assistance with activity   - Consult OT/PT to assist with strengthening/mobility   - Keep Call bell within reach  - Keep bed low and locked with side rails adjusted as appropriate  - Keep care items and personal belongings within reach  - Initiate and maintain comfort rounds  - Make Fall Risk Sign visible to staff  - Offer Toileting every  Hours, in advance of need  - Initiate/Maintain alarm  - Obtain necessary fall risk management equipment:   - Apply yellow socks and bracelet for high fall risk patients  - Consider moving patient to room near nurses station  Outcome: Progressing     Problem: MOBILITY - ADULT  Goal: Maintain or return to baseline ADL function  Description: INTERVENTIONS:  -  Assess patient's ability to carry out ADLs; assess patient's baseline for ADL function and identify physical deficits which impact ability to perform ADLs (bathing, care of mouth/teeth, toileting, grooming, dressing, etc )  - Assess/evaluate cause of self-care deficits   - Assess range of motion  - Assess patient's mobility; develop plan if impaired  - Assess patient's need for assistive devices and provide as appropriate  - Encourage maximum independence but intervene and supervise when necessary  - Involve family in performance of ADLs  - Assess for home care needs following discharge   - Consider OT consult to assist with ADL evaluation and planning for discharge  - Provide patient education as appropriate  Outcome: Progressing  Goal: Maintains/Returns to pre admission functional level  Description: INTERVENTIONS:  - Perform BMAT or MOVE assessment daily    - Set and communicate daily mobility goal to care team and patient/family/caregiver     - Collaborate with rehabilitation services on mobility goals if consulted  - Perform Range of Motion  times a day  - Reposition patient hours    - Dangle patient  times a day  - Stand patient  times a day  - Ambulate patient  times a day  - Out of bed to chair  times a day   - Out of bed for meals  times a day  - Out of bed for toileting  - Record patient progress and toleration of activity level   Outcome: Progressing     Problem: PAIN - ADULT  Goal: Verbalizes/displays adequate comfort level or baseline comfort level  Description: Interventions:  - Encourage patient to monitor pain and request assistance  - Assess pain using appropriate pain scale  - Administer analgesics based on type and severity of pain and evaluate response  - Implement non-pharmacological measures as appropriate and evaluate response  - Consider cultural and social influences on pain and pain management  - Notify physician/advanced practitioner if interventions unsuccessful or patient reports new pain  Outcome: Progressing     Problem: INFECTION - ADULT  Goal: Absence or prevention of progression during hospitalization  Description: INTERVENTIONS:  - Assess and monitor for signs and symptoms of infection  - Monitor lab/diagnostic results  - Monitor all insertion sites, i e  indwelling lines, tubes, and drains  - Monitor endotracheal if appropriate and nasal secretions for changes in amount and color  - [place] appropriate cooling/warming therapies per order  - Administer medications as ordered  - Instruct and encourage patient and family to use good hand hygiene technique  - Identify and instruct in appropriate isolation precautions for identified infection/condition  Outcome: Progressing     Problem: DISCHARGE PLANNING  Goal: Discharge to home or other facility with appropriate resources  Description: INTERVENTIONS:  - Identify barriers to discharge w/patient and caregiver  - Arrange for needed discharge resources and transportation as appropriate  - Identify discharge learning needs (meds, wound care, etc )  - Arrange for interpretive services to assist at discharge as needed  - Refer to Case Management Department for coordinating discharge planning if the patient needs post-hospital services based on physician/advanced practitioner order or complex needs related to functional status, cognitive ability, or social support system  Outcome: Progressing     Problem: SAFETY ADULT  Goal: Patient will remain free of falls  Description: INTERVENTIONS:  - Educate patient/family on patient safety including physical limitations  - Instruct patient to call for assistance with activity   - Consult OT/PT to assist with strengthening/mobility   - Keep Call bell within reach  - Keep bed low and locked with side rails adjusted as appropriate  - Keep care items and personal belongings within reach  - Initiate and maintain comfort rounds  - Make Fall Risk Sign visible to staff  - Offer Toileting every  Hours, in advance of need  - Initiate/Maintain alarm  - Obtain necessary fall risk management equipment:   - Apply yellow socks and bracelet for high fall risk patients  - Consider moving patient to room near nurses station  Outcome: Progressing  Goal: Maintain or return to baseline ADL function  Description: INTERVENTIONS:  -  Assess patient's ability to carry out ADLs; assess patient's baseline for ADL function and identify physical deficits which impact ability to perform ADLs (bathing, care of mouth/teeth, toileting, grooming, dressing, etc )  - Assess/evaluate cause of self-care deficits   - Assess range of motion  - Assess patient's mobility; develop plan if impaired  - Assess patient's need for assistive devices and provide as appropriate  - Encourage maximum independence but intervene and supervise when necessary  - Involve family in performance of ADLs  - Assess for home care needs following discharge   - Consider OT consult to assist with ADL evaluation and planning for discharge  - Provide patient education as appropriate  Outcome: Progressing  Goal: Maintains/Returns to pre admission functional level  Description: INTERVENTIONS:  - Perform BMAT or MOVE assessment daily    - Set and communicate daily mobility goal to care team and patient/family/caregiver  - Collaborate with rehabilitation services on mobility goals if consulted  - Perform Range of Motion  times a day  - Reposition patient every  hours    - Dangle patient  times a day  - Stand patient  times a day  - Ambulate patient  times a day  - Out of bed to chair  times a day   - Out of bed for meals  times a day  - Out of bed for toileting  - Record patient progress and toleration of activity level   Outcome: Progressing

## 2022-05-27 NOTE — MALNUTRITION/BMI
This medical record reflects one or more clinical indicators suggestive of malnutrition and/or morbid obesity  Malnutrition Findings:   Adult Malnutrition type: Chronic illness  Adult Degree of Malnutrition: Other severe protein calorie malnutrition  Malnutrition Characteristics: Muscle loss, Fat loss                  360 Statement: Evidenced by muscle wasting/loss/depletion of temples/hollowing, protruding clavicles, fat loss/orbitals/severe depletion/dark circles, treated with diet and supplements (recommended)    BMI Findings:  Adult BMI Classifications: Underweight < 18 5        Body mass index is 17 85 kg/m²  See Nutrition note dated 5/27/22 for additional details  Completed nutrition assessment is viewable in the nutrition documentation

## 2022-05-31 LAB
BACTERIA BLD CULT: NORMAL
BACTERIA BLD CULT: NORMAL

## 2022-05-31 NOTE — PHYSICIAN ADVISOR
Current patient class: Inpatient  The patient is currently on Hospital Day: 2 at [address] [place] [address]      The patient was admitted to the hospital  on 5/26/22 at  2:30 PM for the following diagnosis:  Dehydration [E86 0]  Sinus bradycardia [R00 1]  Weakness [R53 1]  Thrombocytopenia (HCC) [D69 6]  Hypotension [I95 9]  Prolonged Q-T interval on ECG [R94 31]     After review of the relevant documentation, labs, vital signs and test results, this is a PROVIDER LIABLE case  In this particular case the patient was admitted to the hospital as an inpatient  The patient however failed to satisfy the 2 midnight benchmark and closer scrutiny of the case was warranted  After review of the patient presentation and relevant labs the patient was most appropriate for observation or outpatient class at the time of admission  Given that this patient has already been discharged prior to this review they become a provider liable case  Rationale is as follows: The patient is a 39 yrs   Male who presented to the ED at 5/26/2022 11:11 AM with a chief complaint of Weakness - Generalized (Per group home staff, pt had sudden onset of weakness and lethargy  Pt also more pale than usual   Pt present extremely dehydrated  ) Patient admitted for progressive weakness and hypotension and bradycardia  He was treated with IVF with improvement  He had recently been started on sinemet for gait instability and tremors  He was stable for discharge on hospital day 2  I recommend part b correction      The patients vitals on arrival were   ED Triage Vitals   Temperature Pulse Respirations Blood Pressure SpO2   05/26/22 1135 05/26/22 1115 05/26/22 1115 05/26/22 1123 05/26/22 1115   (!) 97 2 °F (36 2 °C) (!) 47 14 (!) 82/47 100 %      Temp Source Heart Rate Source Patient Position - Orthostatic VS BP Location FiO2 (%)   05/26/22 1135 05/26/22 1115 05/26/22 1115 05/26/22 1115 --   Rectal Monitor Lying Right arm       Pain Score       05/26/22 1552       No Pain           Past Medical History:   Diagnosis Date    ADHD     Depression     Depression     Dysphagia     Incontinence     Intermittent explosive disorder     Mood disorder ([facility] [address] )     MR (mental retardation)     MR (mental retardation), moderate     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     Oppositional defiant disorder     Oppositional defiant disorder     Psychiatric disorder     Seasonal allergies     Thoracic scoliosis      No past surgical history on file  Consults have been placed to:   None    Vitals:    05/27/22 0552 05/27/22 0748 05/27/22 1001 05/27/22 1008   BP:  108/70 127/78 130/84   Pulse:  59 65    Resp:  20     Temp:  98 2 °F (36 8 °C)     TempSrc:       SpO2:  100% 99%    Weight: 52 7 kg (116 lb 2 9 oz)  51 7 kg (114 lb)    Height:   5' 7" (1 702 m)        Most recent labs:    No results for input(s): WBC, HGB, HCT, PLT, K, NA, CALCIUM, BUN, CREATININE, LIPASE, AMYLASE, INR, TROPONINI, CKTOTAL, AST, ALT, ALKPHOS, BILITOT in the last 72 hours  Scheduled Meds:  Continuous Infusions:No current facility-administered medications for this encounter  PRN Meds:      Surgical procedures (if appropriate):

## 2023-02-24 ENCOUNTER — OFFICE VISIT (OUTPATIENT)
Dept: URGENT CARE | Age: 43
End: 2023-02-24

## 2023-02-24 VITALS
TEMPERATURE: 98 F | HEART RATE: 67 BPM | DIASTOLIC BLOOD PRESSURE: 82 MMHG | SYSTOLIC BLOOD PRESSURE: 128 MMHG | RESPIRATION RATE: 14 BRPM | OXYGEN SATURATION: 99 %

## 2023-02-24 DIAGNOSIS — T76.11XA ADULT PHYSICAL ABUSE, SUSPECTED, INITIAL ENCOUNTER: Primary | ICD-10-CM
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
## 2024-08-23 ENCOUNTER — APPOINTMENT (OUTPATIENT)
Dept: MRI IMAGING | Facility: HOSPITAL | Age: 44
End: 2024-08-23
Payer: COMMERCIAL

## 2024-08-23 ENCOUNTER — HOSPITAL ENCOUNTER (OUTPATIENT)
Facility: HOSPITAL | Age: 44
Setting detail: OBSERVATION
Discharge: HOME HEALTH | End: 2024-08-24
Attending: EMERGENCY MEDICINE | Admitting: INTERNAL MEDICINE
Payer: COMMERCIAL

## 2024-08-23 ENCOUNTER — APPOINTMENT (EMERGENCY)
Dept: RADIOLOGY | Facility: HOSPITAL | Age: 44
End: 2024-08-23
Payer: COMMERCIAL

## 2024-08-23 ENCOUNTER — APPOINTMENT (EMERGENCY)
Dept: CT IMAGING | Facility: HOSPITAL | Age: 44
End: 2024-08-23
Payer: COMMERCIAL

## 2024-08-23 DIAGNOSIS — R29.90 STROKE-LIKE SYMPTOMS: ICD-10-CM

## 2024-08-23 DIAGNOSIS — R93.89 ABNORMAL COMPUTED TOMOGRAPHY ANGIOGRAPHY (CTA): ICD-10-CM
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
## 2024-08-23 DIAGNOSIS — R53.1 RIGHT SIDED WEAKNESS: Primary | ICD-10-CM

## 2024-08-23 DIAGNOSIS — G45.9 TIA (TRANSIENT ISCHEMIC ATTACK): ICD-10-CM

## 2024-08-23 PROBLEM — R07.9 CHEST PAIN: Status: ACTIVE | Noted: 2024-08-23

## 2024-08-23 LAB
2HR DELTA HS TROPONIN: >1 NG/L
4HR DELTA HS TROPONIN: >1 NG/L
ALBUMIN SERPL BCG-MCNC: 4 G/DL (ref 3.5–5)
ALP SERPL-CCNC: 85 U/L (ref 34–104)
ALT SERPL W P-5'-P-CCNC: 4 U/L (ref 7–52)
ANION GAP SERPL CALCULATED.3IONS-SCNC: 4 MMOL/L (ref 4–13)
APTT PPP: 30 SECONDS (ref 23–34)
AST SERPL W P-5'-P-CCNC: 37 U/L (ref 13–39)
ATRIAL RATE: 67 BPM
BACTERIA UR QL AUTO: NORMAL /HPF
BASOPHILS # BLD AUTO: 0.01 THOUSANDS/ÂΜL (ref 0–0.1)
BASOPHILS NFR BLD AUTO: 0 % (ref 0–1)
BILIRUB SERPL-MCNC: 0.38 MG/DL (ref 0.2–1)
BILIRUB UR QL STRIP: NEGATIVE
BUN SERPL-MCNC: 22 MG/DL (ref 5–25)
CALCIUM SERPL-MCNC: 9.2 MG/DL (ref 8.4–10.2)
CARDIAC TROPONIN I PNL SERPL HS: 3 NG/L
CARDIAC TROPONIN I PNL SERPL HS: 3 NG/L
CARDIAC TROPONIN I PNL SERPL HS: <2 NG/L
CHLORIDE SERPL-SCNC: 105 MMOL/L (ref 96–108)
CLARITY UR: CLEAR
CO2 SERPL-SCNC: 29 MMOL/L (ref 21–32)
COLOR UR: YELLOW
CREAT SERPL-MCNC: 0.77 MG/DL (ref 0.6–1.3)
EOSINOPHIL # BLD AUTO: 0.04 THOUSAND/ÂΜL (ref 0–0.61)
EOSINOPHIL NFR BLD AUTO: 1 % (ref 0–6)
ERYTHROCYTE [DISTWIDTH] IN BLOOD BY AUTOMATED COUNT: 13.1 % (ref 11.6–15.1)
FLUAV RNA RESP QL NAA+PROBE: NEGATIVE
FLUBV RNA RESP QL NAA+PROBE: NEGATIVE
GFR SERPL CREATININE-BSD FRML MDRD: 110 ML/MIN/1.73SQ M
GLUCOSE SERPL-MCNC: 80 MG/DL (ref 65–140)
GLUCOSE SERPL-MCNC: 87 MG/DL (ref 65–140)
GLUCOSE UR STRIP-MCNC: NEGATIVE MG/DL
HCT VFR BLD AUTO: 49.7 % (ref 36.5–49.3)
HGB BLD-MCNC: 15.8 G/DL (ref 12–17)
HGB UR QL STRIP.AUTO: NEGATIVE
IMM GRANULOCYTES # BLD AUTO: 0.01 THOUSAND/UL (ref 0–0.2)
IMM GRANULOCYTES NFR BLD AUTO: 0 % (ref 0–2)
INR PPP: 1.03 (ref 0.85–1.19)
KETONES UR STRIP-MCNC: ABNORMAL MG/DL
LEUKOCYTE ESTERASE UR QL STRIP: NEGATIVE
LYMPHOCYTES # BLD AUTO: 0.79 THOUSANDS/ÂΜL (ref 0.6–4.47)
LYMPHOCYTES NFR BLD AUTO: 20 % (ref 14–44)
MCH RBC QN AUTO: 28.1 PG (ref 26.8–34.3)
MCHC RBC AUTO-ENTMCNC: 31.8 G/DL (ref 31.4–37.4)
MCV RBC AUTO: 88 FL (ref 82–98)
MONOCYTES # BLD AUTO: 0.59 THOUSAND/ÂΜL (ref 0.17–1.22)
MONOCYTES NFR BLD AUTO: 15 % (ref 4–12)
NEUTROPHILS # BLD AUTO: 2.58 THOUSANDS/ÂΜL (ref 1.85–7.62)
NEUTS SEG NFR BLD AUTO: 64 % (ref 43–75)
NITRITE UR QL STRIP: NEGATIVE
NON-SQ EPI CELLS URNS QL MICRO: NORMAL /HPF
NRBC BLD AUTO-RTO: 0 /100 WBCS
P AXIS: 75 DEGREES
PH UR STRIP.AUTO: 6.5 [PH]
PLATELET # BLD AUTO: 109 THOUSANDS/UL (ref 149–390)
PMV BLD AUTO: 11.4 FL (ref 8.9–12.7)
POTASSIUM SERPL-SCNC: 4.4 MMOL/L (ref 3.5–5.3)
PR INTERVAL: 110 MS
PROT SERPL-MCNC: 6.9 G/DL (ref 6.4–8.4)
PROT UR STRIP-MCNC: ABNORMAL MG/DL
PROTHROMBIN TIME: 14 SECONDS (ref 12.3–15)
QRS AXIS: 83 DEGREES
QRSD INTERVAL: 92 MS
QT INTERVAL: 414 MS
QTC INTERVAL: 437 MS
RBC # BLD AUTO: 5.63 MILLION/UL (ref 3.88–5.62)
RBC #/AREA URNS AUTO: NORMAL /HPF
RSV RNA RESP QL NAA+PROBE: NEGATIVE
SARS-COV-2 RNA RESP QL NAA+PROBE: NEGATIVE
SODIUM SERPL-SCNC: 138 MMOL/L (ref 135–147)
SP GR UR STRIP.AUTO: 1.01 (ref 1–1.03)
T WAVE AXIS: 82 DEGREES
UROBILINOGEN UR STRIP-ACNC: <2 MG/DL
VENTRICULAR RATE: 67 BPM
WBC # BLD AUTO: 4.02 THOUSAND/UL (ref 4.31–10.16)
WBC #/AREA URNS AUTO: NORMAL /HPF

## 2024-08-23 PROCEDURE — 82948 REAGENT STRIP/BLOOD GLUCOSE: CPT

## 2024-08-23 PROCEDURE — 85730 THROMBOPLASTIN TIME PARTIAL: CPT | Performed by: PHYSICIAN ASSISTANT

## 2024-08-23 PROCEDURE — 99204 OFFICE O/P NEW MOD 45 MIN: CPT | Performed by: PSYCHIATRY & NEUROLOGY

## 2024-08-23 PROCEDURE — 92610 EVALUATE SWALLOWING FUNCTION: CPT

## 2024-08-23 PROCEDURE — 93005 ELECTROCARDIOGRAM TRACING: CPT

## 2024-08-23 PROCEDURE — 71045 X-RAY EXAM CHEST 1 VIEW: CPT

## 2024-08-23 PROCEDURE — 70498 CT ANGIOGRAPHY NECK: CPT

## 2024-08-23 PROCEDURE — 0241U HB NFCT DS VIR RESP RNA 4 TRGT: CPT | Performed by: PHYSICIAN ASSISTANT

## 2024-08-23 PROCEDURE — 81001 URINALYSIS AUTO W/SCOPE: CPT | Performed by: PHYSICIAN ASSISTANT

## 2024-08-23 PROCEDURE — 36415 COLL VENOUS BLD VENIPUNCTURE: CPT | Performed by: PHYSICIAN ASSISTANT

## 2024-08-23 PROCEDURE — 99285 EMERGENCY DEPT VISIT HI MDM: CPT | Performed by: PHYSICIAN ASSISTANT

## 2024-08-23 PROCEDURE — 70551 MRI BRAIN STEM W/O DYE: CPT

## 2024-08-23 PROCEDURE — 93010 ELECTROCARDIOGRAM REPORT: CPT | Performed by: INTERNAL MEDICINE

## 2024-08-23 PROCEDURE — 85025 COMPLETE CBC W/AUTO DIFF WBC: CPT | Performed by: PHYSICIAN ASSISTANT

## 2024-08-23 PROCEDURE — 70496 CT ANGIOGRAPHY HEAD: CPT

## 2024-08-23 PROCEDURE — 99285 EMERGENCY DEPT VISIT HI MDM: CPT

## 2024-08-23 PROCEDURE — 99222 1ST HOSP IP/OBS MODERATE 55: CPT | Performed by: INTERNAL MEDICINE

## 2024-08-23 PROCEDURE — 84484 ASSAY OF TROPONIN QUANT: CPT | Performed by: PHYSICIAN ASSISTANT

## 2024-08-23 PROCEDURE — 85610 PROTHROMBIN TIME: CPT | Performed by: PHYSICIAN ASSISTANT

## 2024-08-23 PROCEDURE — 80053 COMPREHEN METABOLIC PANEL: CPT | Performed by: PHYSICIAN ASSISTANT

## 2024-08-23 RX ORDER — BRINZOLAMIDE 10 MG/ML
1 SUSPENSION/ DROPS OPHTHALMIC 2 TIMES DAILY
COMMUNITY

## 2024-08-23 RX ORDER — CARBIDOPA AND LEVODOPA 25; 100 MG/1; MG/1
1.5 TABLET ORAL 3 TIMES DAILY
Status: DISCONTINUED | OUTPATIENT
Start: 2024-08-23 | End: 2024-08-24 | Stop reason: HOSPADM

## 2024-08-23 RX ORDER — LATANOPROST 50 UG/ML
1 SOLUTION/ DROPS OPHTHALMIC
COMMUNITY

## 2024-08-23 RX ORDER — DESMOPRESSIN ACETATE 0.1 MG/1
0.1 TABLET ORAL DAILY
COMMUNITY

## 2024-08-23 RX ORDER — DIPHENHYDRAMINE HCL 25 MG
25 TABLET ORAL
Status: DISCONTINUED | OUTPATIENT
Start: 2024-08-23 | End: 2024-08-24 | Stop reason: HOSPADM

## 2024-08-23 RX ORDER — CLOPIDOGREL BISULFATE 75 MG/1
300 TABLET ORAL ONCE
Status: COMPLETED | OUTPATIENT
Start: 2024-08-23 | End: 2024-08-23

## 2024-08-23 RX ORDER — LATANOPROST 50 UG/ML
1 SOLUTION/ DROPS OPHTHALMIC
Status: DISCONTINUED | OUTPATIENT
Start: 2024-08-23 | End: 2024-08-24 | Stop reason: HOSPADM

## 2024-08-23 RX ORDER — DESMOPRESSIN ACETATE 0.1 MG/1
0.1 TABLET ORAL DAILY
Status: DISCONTINUED | OUTPATIENT
Start: 2024-08-24 | End: 2024-08-24 | Stop reason: HOSPADM

## 2024-08-23 RX ORDER — CITALOPRAM HYDROBROMIDE 10 MG/1
10 TABLET ORAL DAILY
Status: DISCONTINUED | OUTPATIENT
Start: 2024-08-24 | End: 2024-08-24 | Stop reason: HOSPADM

## 2024-08-23 RX ORDER — ASPIRIN 325 MG
325 TABLET ORAL ONCE
Status: DISCONTINUED | OUTPATIENT
Start: 2024-08-23 | End: 2024-08-23

## 2024-08-23 RX ORDER — FLUTICASONE PROPIONATE 50 MCG
2 SPRAY, SUSPENSION (ML) NASAL DAILY
Status: DISCONTINUED | OUTPATIENT
Start: 2024-08-24 | End: 2024-08-24 | Stop reason: HOSPADM

## 2024-08-23 RX ORDER — ACETAMINOPHEN 325 MG/1
650 TABLET ORAL EVERY 6 HOURS PRN
Status: DISCONTINUED | OUTPATIENT
Start: 2024-08-23 | End: 2024-08-24 | Stop reason: HOSPADM

## 2024-08-23 RX ORDER — PANTOPRAZOLE SODIUM 40 MG/1
40 TABLET, DELAYED RELEASE ORAL
Status: DISCONTINUED | OUTPATIENT
Start: 2024-08-24 | End: 2024-08-24 | Stop reason: HOSPADM

## 2024-08-23 RX ORDER — POLYETHYLENE GLYCOL 3350 17 G/17G
17 POWDER, FOR SOLUTION ORAL DAILY
Status: DISCONTINUED | OUTPATIENT
Start: 2024-08-24 | End: 2024-08-24 | Stop reason: HOSPADM

## 2024-08-23 RX ORDER — ATORVASTATIN CALCIUM 40 MG/1
40 TABLET, FILM COATED ORAL EVERY EVENING
Status: DISCONTINUED | OUTPATIENT
Start: 2024-08-23 | End: 2024-08-23

## 2024-08-23 RX ORDER — ASPIRIN 300 MG/1
300 SUPPOSITORY RECTAL ONCE
Status: COMPLETED | OUTPATIENT
Start: 2024-08-23 | End: 2024-08-23

## 2024-08-23 RX ADMIN — CARBIDOPA AND LEVODOPA 1.5 TABLET: 25; 100 TABLET ORAL at 20:06

## 2024-08-23 RX ADMIN — ASPIRIN 300 MG: 300 SUPPOSITORY RECTAL at 13:08

## 2024-08-23 RX ADMIN — CLOPIDOGREL BISULFATE 300 MG: 75 TABLET ORAL at 14:02

## 2024-08-23 RX ADMIN — LATANOPROST 1 DROP: 50 SOLUTION OPHTHALMIC at 23:34

## 2024-08-23 RX ADMIN — IOHEXOL 85 ML: 350 INJECTION, SOLUTION INTRAVENOUS at 12:28

## 2024-08-23 RX ADMIN — ATORVASTATIN CALCIUM 40 MG: 40 TABLET, FILM COATED ORAL at 18:14

## 2024-08-23 NOTE — ASSESSMENT & PLAN NOTE
Presenting with weakness, unable to ambulate  Admitted under stroke protocol  CT, CTA-no acute infarct, no large vessel occlusion  - Stroke pathway  Neurology consulted  MRI brain  Echo  Lipid Panel  Hemoglobin A1c  Aspirin 81 mg   Atorvastatin 40 mg  Permissive HTN, labetalol if SBP >200  Continue telemetry  PT/OT/ST  Frequent neuro checks. Continue to monitor and notify neurology with any changes.    Had MRI later today, negative for stroke  DC stroke pathway
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
## 2024-08-23 NOTE — ASSESSMENT & PLAN NOTE
Check troponins, BNP  Continue telemetry  If persistent chest pain, will request cardiology consult

## 2024-08-23 NOTE — ASSESSMENT & PLAN NOTE
43 y/o male with MRI, depression, ODD, who presented on 8/23 after he was noted to be falling backward and unable to ambulate. In ED, he was noted to have R sided weakness so stroke alert was initiated. BP on presentation 108/70. NIHSS 4. Patient was deemed not a TNK candidate due to resolving symptoms. CTH without acute infarct noted. CTA H/N without LVO or IR target. MRI brain without acute infarct noted.    Plan:  - Stroke pathway  Echo  Lipid Panel  Hemoglobin A1c  S/p aspirin 300 mg DE and Plavix 300 mg x 1  Atorvastatin 40 mg  Goal normotension; avoid hypotension  Continue telemetry  PT/OT/ST  Frequent neuro checks. Continue to monitor and notify neurology with any changes.   - Medical management and supportive care per primary team. Correction of any metabolic or infectious disturbances.

## 2024-08-23 NOTE — H&P
[facility]  H&P  Name: Damien May 44 y.o. male I MRN: [id number]  Unit/Bed#: -01 I Date of Admission: 8/23/2024   Date of Service: 8/23/2024 I Hospital Day: 0      Assessment & Plan   Chest pain  Assessment & Plan  Check troponins, BNP  Continue telemetry  If persistent chest pain, will request cardiology consult      Stroke-like symptoms  Assessment & Plan  Presenting with weakness, unable to ambulate  Admitted under stroke protocol  CT, CTA-no acute infarct, no large vessel occlusion  - Stroke pathway  Neurology consulted  MRI brain  Echo  Lipid Panel  Hemoglobin A1c  Aspirin 81 mg   Atorvastatin 40 mg  Permissive HTN, labetalol if SBP >200  Continue telemetry  PT/OT/ST  Frequent neuro checks. Continue to monitor and notify neurology with any changes.    Had MRI later today, negative for stroke  DC stroke pathway    Ambulatory dysfunction  Assessment & Plan  Complaining of dizziness, ambulatory dysfunction  PT/OT evaluation  Dad insisted discharge after negative stroke  Will need ambulation prior to discharge  Patient walked with dad, developed chest pain             VTE Pharmacologic Prophylaxis: VTE Score: 1 Low Risk (Score 0-2) - Encourage Ambulation.  Code Status: Level 1  Discussion with family: Updated  (father) at bedside.    Anticipated Length of Stay: Patient will be admitted on an observation basis with an anticipated length of stay of less than 2 midnights secondary to strokelike symptoms.    Total Time Spent on Date of Encounter in care of patient: 60 mins. This time was spent on one or more of the following: performing physical exam; counseling and coordination of care; obtaining or reviewing history; documenting in the medical record; reviewing/ordering tests, medications or procedures; communicating with other healthcare professionals and discussing with patient's family/caregivers.    Chief Complaint: Multiple complaints= chest pain, dizziness,  difficulty with ambulation, right arm and leg weakness as per staff member       History of Present Illness:  Damien May is a 44 y.o. male with a PMH of intellectual disability, psychiatric disorder who presents with dizziness, inability to ambulate, chest pain.  Patient is not able to give history.  Patient's father bedside, stated patient would not be able to give meaningful history.  History obtained from chart review and patient's father.    Patient was at [employer], caregiver noted he was falling backward, had difficulty with ambulation, complained of chest pain and throat pain, weakness to right arm and right leg as per staff member.    Review of Systems:  Review of Systems   Reason unable to perform ROS: Patient is intellectually disabled.       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 No pertinent surgical history.    Meds/Allergies:  Prior to Admission medications    Medication Sig Start Date End Date Taking? Authorizing Provider   brinzolamide (AZOPT) 1 % ophthalmic suspension Administer 1 drop to both eyes 2 (two) times a day   Yes Historical Provider, MD   desmopressin (DDAVP) 0.1 mg tablet Take 0.1 mg by mouth daily   Yes Historical Provider, MD   latanoprost (XALATAN) 0.005 % ophthalmic solution Administer 1 drop to both eyes daily at bedtime   Yes Historical Provider, MD   omeprazole (PriLOSEC) 20 mg delayed release capsule Take 20 mg by mouth daily   Yes Historical Provider, MD   acetaminophen (TYLENOL) 500 mg tablet Take 500 mg by mouth every 6  (six) hours as needed    Historical Provider, MD   brimonidine-timolol (COMBIGAN) 0.2-0.5 % Administer 1 drop to both eyes every 12 (twelve) hours    Historical Provider, MD   carbidopa-levodopa (SINEMET)  mg per tablet Take 1.5 tablets by mouth 3 (three) times a day 5/27/22   Cortez Wagoner MD   citalopram (CeleXA) 10 mg tablet Take 10 mg by mouth daily    Historical Provider, MD   diphenhydrAMINE (BENADRYL) 25 mg tablet Take 25 mg by mouth as needed for itching    Historical Provider, MD   fluticasone (FLONASE) 50 mcg/act nasal spray 2 sprays into each nostril daily    Historical Provider, MD   polyethylene glycol (MIRALAX) 17 g packet Take 17 g by mouth daily    Historical Provider, MD   albuterol (2.5 mg/3 mL) 0.083 % nebulizer solution Take 2.5 mg by nebulization every 6 (six) hours as needed for wheezing or shortness of breath  8/23/24  Historical Provider, MD   Albuterol Sulfate (VENTOLIN HFA IN) Inhale 1 puff as needed  8/23/24  Historical Provider, MD   Balsam [place] Oil OINT Apply 1 application topically 2 (two) times a day  8/23/24  Historical Provider, MD   benztropine (COGENTIN) 1 mg tablet Take 1 mg by mouth 2 (two) times a day  8/23/24  Historical Provider, MD   clotrimazole (LOTRIMIN) 1 % cream Apply topically 2 (two) times a day  8/23/24  Historical Provider, MD   gabapentin (NEURONTIN) 100 mg capsule Take 100 mg by mouth daily at bedtime  8/23/24  Historical Provider, MD   ibuprofen (MOTRIN) 400 mg tablet Take by mouth every 6 (six) hours as needed for mild pain  8/23/24  Historical Provider, MD   Lidocaine 4 % PTCH Apply 1 patch topically daily hip  8/23/24  Historical Provider, MD   miconazole 2 % cream Apply 1 application topically 2 (two) times a day  8/23/24  Historical Provider, MD   selenium sulfide (SELSUN) 2.5 % shampoo Apply 1 application topically as needed 2x per week PRN  8/23/24  Historical Provider, MD   ZINC OXIDE, TOPICAL, 10 % CREA Apply 1 application topically 2 (two)  times a day groin  8/23/24  Historical Provider, MD WHITE have reviewed home medications with patient family member.    Allergies:   Allergies   Allergen Reactions    Pollen Extract Allergic Rhinitis       Social History:  Marital Status: Single   Occupation:   Patient Pre-hospital Living Situation: Home in [place]  Patient Pre-hospital Level of Mobility:  Not good at baseline but worse today  Patient Pre-hospital Diet Restrictions: Modified diet  Substance Use History:   Social History     Substance and Sexual Activity   Alcohol Use No     Social History     Tobacco Use   Smoking Status Never   Smokeless Tobacco Never     Social History     Substance and Sexual Activity   Drug Use Not Currently       Family History:  History reviewed. No pertinent family history.    Physical Exam:     Vitals:   Blood Pressure: 118/79 (08/23/24 1757)  Pulse: 66 (08/23/24 1757)  Temperature: (!) 97.4 °F (36.3 °C) (08/23/24 1757)  Temp Source: Temporal (08/23/24 1137)  Respirations: 16 (08/23/24 1757)  SpO2: 99 % (08/23/24 1757)    Physical Exam  Vitals reviewed.   HENT:      Head: Normocephalic and atraumatic.      Mouth/Throat:      Mouth: Mucous membranes are moist.      Pharynx: Oropharynx is clear.   Eyes:      Conjunctiva/sclera: Conjunctivae normal.   Cardiovascular:      Rate and Rhythm: Normal rate.   Pulmonary:      Effort: Pulmonary effort is normal.   Abdominal:      Palpations: Abdomen is soft.   Skin:     General: Skin is warm and dry.      Capillary Refill: Capillary refill takes 2 to 3 seconds.   Neurological:      Mental Status: He is alert. Mental status is at baseline.          Additional Data:     Lab Results:  Results from last 7 days   Lab Units 08/23/24  1145   WBC Thousand/uL 4.02*   HEMOGLOBIN g/dL 15.8   HEMATOCRIT % 49.7*   PLATELETS Thousands/uL 109*   SEGS PCT % 64   LYMPHO PCT % 20   MONO PCT % 15*   EOS PCT % 1     Results from last 7 days   Lab Units 08/23/24  1145   SODIUM mmol/L 138   POTASSIUM  "mmol/L 4.4   CHLORIDE mmol/L 105   CO2 mmol/L 29   BUN mg/dL 22   CREATININE mg/dL 0.77   ANION GAP mmol/L 4   CALCIUM mg/dL 9.2   ALBUMIN g/dL 4.0   TOTAL BILIRUBIN mg/dL 0.38   ALK PHOS U/L 85   ALT U/L 4*   AST U/L 37   GLUCOSE RANDOM mg/dL 87     Results from last 7 days   Lab Units 08/23/24  1206   INR  1.03     Results from last 7 days   Lab Units 08/23/24  1204   POC GLUCOSE mg/dl 80     No results found for: \"HGBA1C\"        Lines/Drains:  Invasive Devices       Peripheral Intravenous Line  Duration             Peripheral IV 08/23/24 Left Antecubital <1 day    Peripheral IV 08/23/24 Left Hand <1 day                        Imaging: Reviewed radiology reports from this admission including: CT head and MRI brain  MRI brain wo contrast   Final Result by Elmer Gibbons MD (08/23 1728)      No acute infarct, mass effect or midline shift.      Workstation performed: [id number]         CT stroke alert brain   Final Result by Rolan Welch MD (08/23 1898)      No acute intracranial abnormality.      Basal ganglia mineralization, bilaterally, and extensive white matter hypodensities involving both cerebral hemispheres, greater than expected for the patient's age. These findings may represent Fahr's disease, a mitochondrial/dysmyelinating disorder,    and endocrinopathy resulting in a calcium phosphate metabolism disorder, or alternatively the sequela of prior insult. Correlation with the patient's past medical history is recommended.      Findings were directly discussed with Dr. Merrill Hutchinson at approximately 12:25 p.m. on 8/23/2024.      Workstation performed: [id number]         CTA stroke alert (head/neck)   Final Result by Rolan Welch MD (08/23 4547)      No large vessel occlusion, high-grade stenosis identified on CT angiogram of the head. 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      No hemodynamically significant " stenosis or dissection identified on CT angiogram of the neck.         Findings were directly discussed with Dr. Silverio Hutchinson at 12:38 p.m. on 8/23/2024.      Workstation performed: [id number]         XR chest 1 view portable   Final Result by Rolo Arellano MD (08/23 1341)      No acute cardiopulmonary disease.            Workstation performed: [id number]             EKG and Other Studies Reviewed on Admission:   EKG: NSR. HR 67.    ** Please Note: This note has been constructed using a voice recognition system. **

## 2024-08-23 NOTE — PLAN OF CARE
Problem: Potential for Falls  Goal: Patient will remain free of falls  Description: INTERVENTIONS:  - Educate patient/family on patient safety including physical limitations  - Instruct patient to call for assistance with activity   - Consult OT/PT to assist with strengthening/mobility   - Keep Call bell within reach  - Keep bed low and locked with side rails adjusted as appropriate  - Keep care items and personal belongings within reach  - Initiate and maintain comfort rounds  - Make Fall Risk Sign visible to staff  - Offer Toileting every 3 Hours, in advance of need  - Initiate/Maintain bed alarm  - Obtain necessary fall risk management equipment: n/a  - Apply yellow socks and bracelet for high fall risk patients  - Consider moving patient to room near nurses station  Outcome: Progressing     Problem: PAIN - ADULT  Goal: Verbalizes/displays adequate comfort level or baseline comfort level  Description: Interventions:  - Encourage patient to monitor pain and request assistance  - Assess pain using appropriate pain scale  - Administer analgesics based on type and severity of pain and evaluate response  - Implement non-pharmacological measures as appropriate and evaluate response  - Consider cultural and social influences on pain and pain management  - Notify physician/advanced practitioner if interventions unsuccessful or patient reports new pain  Outcome: Progressing     Problem: INFECTION - ADULT  Goal: Absence or prevention of progression during hospitalization  Description: INTERVENTIONS:  - Assess and monitor for signs and symptoms of infection  - Monitor lab/diagnostic results  - Monitor all insertion sites, i.e. indwelling lines, tubes, and drains  - Monitor endotracheal if appropriate and nasal secretions for changes in amount and color  - [place] appropriate cooling/warming therapies per order  - Administer medications as ordered  - Instruct and encourage patient and family to use good hand hygiene  technique  - Identify and instruct in appropriate isolation precautions for identified infection/condition  Outcome: Progressing     Problem: SAFETY ADULT  Goal: Patient will remain free of falls  Description: INTERVENTIONS:  - Educate patient/family on patient safety including physical limitations  - Instruct patient to call for assistance with activity   - Consult OT/PT to assist with strengthening/mobility   - Keep Call bell within reach  - Keep bed low and locked with side rails adjusted as appropriate  - Keep care items and personal belongings within reach  - Initiate and maintain comfort rounds  - Make Fall Risk Sign visible to staff  - Offer Toileting every 3 Hours, in advance of need  - Initiate/Maintain bed alarm  - Obtain necessary fall risk management equipment: n/a  - Apply yellow socks and bracelet for high fall risk patients  - Consider moving patient to room near nurses station  Outcome: Progressing  Goal: Maintain or return to baseline ADL function  Description: INTERVENTIONS:  -  Assess patient's ability to carry out ADLs; assess patient's baseline for ADL function and identify physical deficits which impact ability to perform ADLs (bathing, care of mouth/teeth, toileting, grooming, dressing, etc.)  - Assess/evaluate cause of self-care deficits   - Assess range of motion  - Assess patient's mobility; develop plan if impaired  - Assess patient's need for assistive devices and provide as appropriate  - Encourage maximum independence but intervene and supervise when necessary  - Involve family in performance of ADLs  - Assess for home care needs following discharge   - Consider OT consult to assist with ADL evaluation and planning for discharge  - Provide patient education as appropriate  Outcome: Progressing  Goal: Maintains/Returns to pre admission functional level  Description: INTERVENTIONS:  - Perform AM-PAC 6 Click Basic Mobility/ Daily Activity assessment daily.  - Set and communicate daily  mobility goal to care team and patient/family/caregiver.   - Collaborate with rehabilitation services on mobility goals if consulted  - Perform Range of Motion 3 times a day.  - Reposition patient every 3 hours.  - Dangle patient 3 times a day  - Stand patient 3 times a day  - Ambulate patient 3 times a day  - Out of bed to chair 3 times a day   - Out of bed for meals 3 times a day  - Out of bed for toileting  - Record patient progress and toleration of activity level   Outcome: Progressing     Problem: DISCHARGE PLANNING  Goal: Discharge to home or other facility with appropriate resources  Description: INTERVENTIONS:  - Identify barriers to discharge w/patient and caregiver  - Arrange for needed discharge resources and transportation as appropriate  - Identify discharge learning needs (meds, wound care, etc.)  - Arrange for interpretive services to assist at discharge as needed  - Refer to Case Management Department for coordinating discharge planning if the patient needs post-hospital services based on physician/advanced practitioner order or complex needs related to functional status, cognitive ability, or social support system  Outcome: Progressing     Problem: Knowledge Deficit  Goal: Patient/family/caregiver demonstrates understanding of disease process, treatment plan, medications, and discharge instructions  Description: Complete learning assessment and assess knowledge base.  Interventions:  - Provide teaching at level of understanding  - Provide teaching via preferred learning methods  Outcome: Progressing     Problem: NEUROSENSORY - ADULT  Goal: Achieves stable or improved neurological status  Description: INTERVENTIONS  - Monitor and report changes in neurological status  - Monitor vital signs such as temperature, blood pressure, glucose, and any other labs ordered   - Initiate measures to prevent increased intracranial pressure  - Monitor for seizure activity and implement precautions if appropriate       Outcome: Progressing  Goal: Remains free of injury related to seizures activity  Description: INTERVENTIONS  - Maintain airway, patient safety  and administer oxygen as ordered  - Monitor patient for seizure activity, document and report duration and description of seizure to physician/advanced practitioner  - If seizure occurs,  ensure patient safety during seizure  - Reorient patient post seizure  - Seizure pads on all 4 side rails  - Instruct patient/family to notify RN of any seizure activity including if an aura is experienced  - Instruct patient/family to call for assistance with activity based on nursing assessment  - Administer anti-seizure medications if ordered    Outcome: Progressing  Goal: Achieves maximal functionality and self care  Description: INTERVENTIONS  - Monitor swallowing and airway patency with patient fatigue and changes in neurological status  - Encourage and assist patient to increase activity and self care.   - Encourage visually impaired, hearing impaired and aphasic patients to use assistive/communication devices  Outcome: Progressing     Problem: CARDIOVASCULAR - ADULT  Goal: Maintains optimal cardiac output and hemodynamic stability  Description: INTERVENTIONS:  - Monitor I/O, vital signs and rhythm  - Monitor for S/S and trends of decreased cardiac output  - Administer and titrate ordered vasoactive medications to optimize hemodynamic stability  - Assess quality of pulses, skin color and temperature  - Assess for signs of decreased coronary artery perfusion  - Instruct patient to report change in severity of symptoms  Outcome: Progressing  Goal: Absence of cardiac dysrhythmias or at baseline rhythm  Description: INTERVENTIONS:  - Continuous cardiac monitoring, vital signs, obtain 12 lead EKG if ordered  - Administer antiarrhythmic and heart rate control medications as ordered  - Monitor electrolytes and administer replacement therapy as ordered  Outcome: Progressing     Problem:  METABOLIC, FLUID AND ELECTROLYTES - ADULT  Goal: Electrolytes maintained within normal limits  Description: INTERVENTIONS:  - Monitor labs and assess patient for signs and symptoms of electrolyte imbalances  - Administer electrolyte replacement as ordered  - Monitor response to electrolyte replacements, including repeat lab results as appropriate  - Instruct patient on fluid and nutrition as appropriate  Outcome: Progressing  Goal: Fluid balance maintained  Description: INTERVENTIONS:  - Monitor labs   - Monitor I/O and WT  - Instruct patient on fluid and nutrition as appropriate  - Assess for signs & symptoms of volume excess or deficit  Outcome: Progressing  Goal: Glucose maintained within target range  Description: INTERVENTIONS:  - Monitor Blood Glucose as ordered  - Assess for signs and symptoms of hyperglycemia and hypoglycemia  - Administer ordered medications to maintain glucose within target range  - Assess nutritional intake and initiate nutrition service referral as needed  Outcome: Progressing     Problem: MUSCULOSKELETAL - ADULT  Goal: Maintain or return mobility to safest level of function  Description: INTERVENTIONS:  - Assess patient's ability to carry out ADLs; assess patient's baseline for ADL function and identify physical deficits which impact ability to perform ADLs (bathing, care of mouth/teeth, toileting, grooming, dressing, etc.)  - Assess/evaluate cause of self-care deficits   - Assess range of motion  - Assess patient's mobility  - Assess patient's need for assistive devices and provide as appropriate  - Encourage maximum independence but intervene and supervise when necessary  - Involve family in performance of ADLs  - Assess for home care needs following discharge   - Consider OT consult to assist with ADL evaluation and planning for discharge  - Provide patient education as appropriate  Outcome: Progressing  Goal: Maintain proper alignment of affected body part  Description:  INTERVENTIONS:  - Support, maintain and protect limb and body alignment  - Provide patient/ family with appropriate education  Outcome: Progressing

## 2024-08-23 NOTE — ED PROVIDER NOTES
History  Chief Complaint   Patient presents with    Chest Pain     Pt arrives via EMS from [facility] after pt started reporting chest pain. Pt is currently denying any complaints.      Patient is a 45 y/o M with h/o intellectual disability, ODD that was brought to the ED by caregiver.  He states patient was getting off the bus to go to [facility] and started falling backward, patient was unable to ambulate due to being off balance.  Patient was placed back on the bus and c/o chest pain and throat pain.  He no longer has pain.  Patient has weakness to right arm and right leg, per staff member this is new.  Patient not answering questions, appears confused.  No anticoagulants or recent falls.        History provided by:  Caregiver  History limited by:  Acuity of condition  Chest Pain  Associated symptoms: dizziness and weakness    Associated symptoms: no fever and not vomiting        Prior to Admission Medications   Prescriptions Last Dose Informant Patient Reported? Taking?   acetaminophen (TYLENOL) 500 mg tablet   Yes No   Sig: Take 500 mg by mouth every 6 (six) hours as needed   brimonidine-timolol (COMBIGAN) 0.2-0.5 %   Yes No   Sig: Administer 1 drop to both eyes every 12 (twelve) hours   brinzolamide (AZOPT) 1 % ophthalmic suspension   Yes Yes   Sig: Administer 1 drop to both eyes 2 (two) times a day   carbidopa-levodopa (SINEMET)  mg per tablet   No No   Sig: Take 1.5 tablets by mouth 3 (three) times a day   citalopram (CeleXA) 10 mg tablet   Yes No   Sig: Take 10 mg by mouth daily   desmopressin (DDAVP) 0.1 mg tablet   Yes Yes   Sig: Take 0.1 mg by mouth daily   diphenhydrAMINE (BENADRYL) 25 mg tablet   Yes No   Sig: Take 25 mg by mouth as needed for itching   fluticasone (FLONASE) 50 mcg/act nasal spray   Yes No   Si sprays into each nostril daily   latanoprost (XALATAN) 0.005 % ophthalmic solution   Yes Yes   Sig: Administer 1 drop to both eyes daily at bedtime   omeprazole (PriLOSEC) 20 mg delayed release  capsule   Yes Yes   Sig: Take 20 mg by mouth daily   polyethylene glycol (MIRALAX) 17 g packet   Yes No   Sig: Take 17 g by mouth daily      Facility-Administered Medications: None       Past Medical History:   Diagnosis Date    ADHD     Depression     Depression     Dysphagia     Incontinence     Intermittent explosive disorder     Mood disorder (HCC)     MR (mental retardation)     MR (mental retardation), moderate     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     Oppositional defiant disorder     Oppositional defiant disorder     Psychiatric disorder     Seasonal allergies     Thoracic scoliosis        History reviewed. No pertinent surgical history.    History reviewed. No pertinent family history.  I have reviewed and agree with the history as documented.    E-Cigarette/Vaping     E-Cigarette/Vaping Substances     Social History     Tobacco Use    Smoking status: Never    Smokeless tobacco: Never   Substance Use Topics    Alcohol use: No    Drug use: Not Currently       Review of Systems   Unable to perform ROS: Acuity of condition   Constitutional:  Negative for fever.   Cardiovascular:  Positive for chest pain.   Gastrointestinal:  Negative for vomiting.   Skin:  Positive for pallor. Negative for color change.   Neurological:  Positive for dizziness and weakness.   Psychiatric/Behavioral:  Positive for confusion.        Physical Exam  Physical Exam  Vitals and nursing note reviewed.   Constitutional:       Appearance: He is well-developed. He is not ill-appearing.   HENT:      Head: Normocephalic and atraumatic.      Nose: Nose normal.      Mouth/Throat:      Mouth: Mucous membranes are dry.   Eyes:      Conjunctiva/sclera: Conjunctivae normal.      Pupils: Pupils are equal.   Cardiovascular:      Rate and Rhythm: Regular rhythm. Bradycardia present.      Heart sounds: Normal heart sounds.   Pulmonary:      Breath sounds: Decreased breath sounds (due to poor respiratory effort) present.   Abdominal:       General: Abdomen is flat. Bowel sounds are normal.      Palpations: Abdomen is soft.      Tenderness: There is no abdominal tenderness.   Musculoskeletal:      Cervical back: Normal range of motion.   Skin:     General: Skin is warm and dry.      Coloration: Skin is pale.   Neurological:      Mental Status: He is alert. He is disoriented.      GCS: GCS eye subscore is 4. GCS verbal subscore is 2. GCS motor subscore is 5.      Cranial Nerves: Cranial nerves 2-12 are intact.      Motor: Weakness (right arm strength 3/5, right leg strength 1/5. LUE and LLE strength 5/5.) present.      Coordination: Finger-Nose-Finger Test abnormal (unable to follow command for testing.) and Heel to Robertson Test abnormal (unable to follow command for testing.).   Psychiatric:         Mood and Affect: Affect is flat.         Vital Signs  ED Triage Vitals [08/23/24 1137]   Temperature Pulse Respirations Blood Pressure SpO2   98 °F (36.7 °C) 70 18 108/70 100 %      Temp Source Heart Rate Source Patient Position - Orthostatic VS BP Location FiO2 (%)   Temporal Monitor Sitting Right arm --      Pain Score       No Pain           Vitals:    08/23/24 1240 08/23/24 1301 08/23/24 1310 08/23/24 1347   BP: 112/68 114/69 115/73 112/79   Pulse: 65 68 66 58   Patient Position - Orthostatic VS:             Visual Acuity  Visual Acuity      Flowsheet Row Most Recent Value   L Pupil Size (mm) 3   R Pupil Size (mm) 3   L Pupil Shape Round   R Pupil Shape Round            ED Medications  Medications   atorvastatin (LIPITOR) tablet 40 mg (has no administration in time range)   iohexol (OMNIPAQUE) 350 MG/ML injection (MULTI-DOSE) 85 mL (85 mL Intravenous Given 8/23/24 1228)   clopidogrel (PLAVIX) tablet 300 mg (300 mg Oral Given 8/23/24 1402)   aspirin rectal suppository 300 mg (300 mg Rectal Given 8/23/24 1308)       Diagnostic Studies  Results Reviewed       Procedure Component Value Units Date/Time    HS Troponin I 2hr [[id number]]  (Normal) Collected:  08/23/24 1556    Lab Status: Final result Specimen: Blood from Arm, Right Updated: 08/23/24 1626     hs TnI 2hr 3 ng/L      Delta 2hr hsTnI >1 ng/L     UA w Reflex to Microscopic w Reflex to Culture [[id number]]     Lab Status: No result Specimen: Urine, Clean Catch     CBC and differential [[id number]]  (Abnormal) Collected: 08/23/24 1145    Lab Status: Final result Specimen: Blood from Arm, Left Updated: 08/23/24 1257     WBC 4.02 Thousand/uL      RBC 5.63 Million/uL      Hemoglobin 15.8 g/dL      Hematocrit 49.7 %      MCV 88 fL      MCH 28.1 pg      MCHC 31.8 g/dL      RDW 13.1 %      MPV 11.4 fL      Platelets 109 Thousands/uL      nRBC 0 /100 WBCs      Segmented % 64 %      Immature Grans % 0 %      Lymphocytes % 20 %      Monocytes % 15 %      Eosinophils Relative 1 %      Basophils Relative 0 %      Absolute Neutrophils 2.58 Thousands/µL      Absolute Immature Grans 0.01 Thousand/uL      Absolute Lymphocytes 0.79 Thousands/µL      Absolute Monocytes 0.59 Thousand/µL      Eosinophils Absolute 0.04 Thousand/µL      Basophils Absolute 0.01 Thousands/µL     FLU/RSV/COVID - if FLU/RSV clinically relevant [[id number]]  (Normal) Collected: 08/23/24 1205    Lab Status: Final result Specimen: Nares from Nose Updated: 08/23/24 1251     SARS-CoV-2 Negative     INFLUENZA A PCR Negative     INFLUENZA B PCR Negative     RSV PCR Negative    Narrative:      This test has been performed using the CoV-2/Flu/RSV plus assay on the [employer] GeneXpert platform. This test has been validated by the  and verified by the performing laboratory.     This test is designed to amplify and detect the following: nucleocapsid (N), envelope (E), and RNA-dependent RNA polymerase (RdRP) genes of the SARS-CoV-2 genome; matrix (M), basic polymerase (PB2), and acidic protein (PA) segments of the influenza A genome; matrix (M) and non-structural protein (NS) segments of the influenza B genome, and the nucleocapsid genes of RSV A and RSV B.      Positive results are indicative of the presence of Flu A, Flu B, RSV, and/or SARS-CoV-2 RNA. Positive results for SARS-CoV-2 or suspected novel influenza should be reported to state, local, or federal health departments according to local reporting requirements.      All results should be assessed in conjunction with clinical presentation and other laboratory markers for clinical management.     FOR PEDIATRIC PATIENTS - copy/paste COVID Guidelines URL to browser: [email or web address]       HS Troponin I 4hr [[id number]]     Lab Status: No result Specimen: Blood     HS Troponin 0hr (reflex protocol) [[id number]]  (Normal) Collected: 08/23/24 1145    Lab Status: Final result Specimen: Blood from Arm, Left Updated: 08/23/24 1242     hs TnI 0hr <2 ng/L     Protime-INR [[id number]]  (Normal) Collected: 08/23/24 1206    Lab Status: Final result Specimen: Blood from Arm, Left Updated: 08/23/24 1227     Protime 14.0 seconds      INR 1.03    Narrative:      INR Therapeutic Range    Indication                                             INR Range      Atrial Fibrillation                                               2.0-3.0  Hypercoagulable State                                    2.0.2.3  Left Ventricular Asist Device                            2.0-3.0  Mechanical Heart Valve                                  -    Aortic(with afib, MI, embolism, HF, LA enlargement,    and/or coagulopathy)                                     2.0-3.0 (2.5-3.5)     Mitral                                                             2.5-3.5  Prosthetic/Bioprosthetic Heart Valve               2.0-3.0  Venous thromboembolism (VTE: VT, PE        2.0-3.0    APTT [[id number]]  (Normal) Collected: 08/23/24 1206    Lab Status: Final result Specimen: Blood from Arm, Left Updated: 08/23/24 1227     PTT 30 seconds     Comprehensive metabolic panel [[id number]]  (Abnormal) Collected: 08/23/24 1145    Lab  Status: Final result Specimen: Blood from Arm, Left Updated: 08/23/24 1215     Sodium 138 mmol/L      Potassium 4.4 mmol/L      Chloride 105 mmol/L      CO2 29 mmol/L      ANION GAP 4 mmol/L      BUN 22 mg/dL      Creatinine 0.77 mg/dL      Glucose 87 mg/dL      Calcium 9.2 mg/dL      AST 37 U/L      ALT 4 U/L      Alkaline Phosphatase 85 U/L      Total Protein 6.9 g/dL      Albumin 4.0 g/dL      Total Bilirubin 0.38 mg/dL      eGFR 110 ml/min/1.73sq m     Narrative:      National Kidney Disease Foundation guidelines for Chronic Kidney Disease (CKD):     Stage 1 with normal or high GFR (GFR > 90 mL/min/1.73 square meters)    Stage 2 Mild CKD (GFR = 60-89 mL/min/1.73 square meters)    Stage 3A Moderate CKD (GFR = 45-59 mL/min/1.73 square meters)    Stage 3B Moderate CKD (GFR = 30-44 mL/min/1.73 square meters)    Stage 4 Severe CKD (GFR = 15-29 mL/min/1.73 square meters)    Stage 5 End Stage CKD (GFR <15 mL/min/1.73 square meters)  Note: GFR calculation is accurate only with a steady state creatinine    Fingerstick Glucose (POCT) [[id number]]  (Normal) Collected: 08/23/24 1204    Lab Status: Final result Specimen: Blood Updated: 08/23/24 1205     POC Glucose 80 mg/dl                    MRI brain wo contrast   Final Result by Elmer Gibbons MD (08/23 1539)      No acute infarct, mass effect or midline shift.      Workstation performed: [id number]         CT stroke alert brain   Final Result by Rolan Welch MD (08/23 1250)      No acute intracranial abnormality.      Basal ganglia mineralization, bilaterally, and extensive white matter hypodensities involving both cerebral hemispheres, greater than expected for the patient's age. These findings may represent Fahr's disease, a mitochondrial/dysmyelinating disorder,    and endocrinopathy resulting in a calcium phosphate metabolism disorder, or alternatively the sequela of prior insult. Correlation with the patient's past medical history is recommended.      Findings  were directly discussed with Dr. Merrill Hutchinson at approximately 12:25 p.m. on 8/23/2024.      Workstation performed: [id number]         CTA stroke alert (head/neck)   Final Result by Rolan Welch MD (08/23 1240)      No large vessel occlusion, high-grade stenosis identified on CT angiogram of the head. 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      No hemodynamically significant stenosis or dissection identified on CT angiogram of the neck.         Findings were directly discussed with Dr. Silverio Hutchinson at 12:38 p.m. on 8/23/2024.      Workstation performed: [id number]         XR chest 1 view portable   Final Result by Rolo Arellano MD (08/23 1341)      No acute cardiopulmonary disease.            Workstation performed: [id number]                    Procedures  ECG 12 Lead Documentation Only    Date/Time: 8/23/2024 11:40 AM    Performed by: Alexandra Chan PA-C  Authorized by: Alexandra Chan PA-C    Indications / Diagnosis:  Weakness, dizziness  ECG reviewed by me, the ED Provider: yes    Patient location:  ED  Previous ECG:     Previous ECG:  Compared to current    Comparison ECG info:  Qt shortened.  Rate:     ECG rate:  67  Rhythm:     Rhythm: sinus rhythm    Conduction:     Conduction: abnormal      Abnormal conduction: incomplete RBBB    ST segments:     ST segments:  Normal           ED Course  ED Course as of 08/23/24 1641   Fri Aug 23, 2024   1218 Spoke with father, he states patient had a femur fracture right leg 1 year ago and has mild weakness in that leg, but no history of weakness in arms.       1237 Patient re-examined, patient improved, no longer weak in right arm or leg, staff states he is acting normal self.  Discussed this with Dr. Hutchinson, will hold off on TNK since symptoms resolved.  WIll give aspirin and plavix and admit along stroke pathway.  Patient's father, John is aware and  states he will be coming to the hospital.                     Stroke Assessment       Row Name 08/23/24 1204 08/23/24 1237          NIH Stroke Scale    Interval Baseline Other (Comment)  after patient returned from CT scan     Level of Consciousness (1a.) 1 0     LOC Questions (1b.) 1 0     LOC Commands (1c.) 1 0     Best Gaze (2.) 0 0     Visual (3.) 0 0     Facial Palsy (4.) 0 0     Motor Arm, Left (5a.) 0 0     Motor Arm, Right (5b.) 1 0     Motor Leg, Left (6a.) 0 0     Motor Leg, Right (6b.) 2 0     Limb Ataxia (7.) 2  patient not following commands.  unable to raise right arm. 1  patient not following the command, unsure if unable to understand the command.     Sensory (8.) 0 0     Best Language (9.) 2  patient not answering questions. 0     Dysarthria (10.) 0 0     Extinction and Inattention (11.) (Formerly Neglect) 0 0     Total 10 1                   Flowsheet Row Most Recent Value   Thrombolytic Decision Options    Thrombolytic Decision Patient not a candidate.   Patient is not a candidate options Symptoms resolved/clearly non disabling.                      SBIRT 22yo+      Flowsheet Row Most Recent Value   Initial Alcohol Screen: US AUDIT-C     1. How often do you have a drink containing alcohol? 0 Filed at: 08/23/2024 1149   2. How many drinks containing alcohol do you have on a typical day you are drinking?  0 Filed at: 08/23/2024 1149   3a. Male UNDER 65: How often do you have five or more drinks on one occasion? 0 Filed at: 08/23/2024 1149   3b. FEMALE Any Age, or MALE 65+: How often do you have 4 or more drinks on one occassion? 0 Filed at: 08/23/2024 1149   Audit-C Score 0 Filed at: 08/23/2024 1149   BREONNA: How many times in the past year have you...    Used an illegal drug or used a prescription medication for non-medical reasons? Never Filed at: 08/23/2024 1149                      Medical Decision Making  Patient with dizziness, off balance, right sided weakness, will order labs, CT scan to r/o  CVA.  Patient's symptoms improved while in the ED, d/w neuro, will admit along stroke pathway to r/o TIA.       Amount and/or Complexity of Data Reviewed  Independent Historian: caregiver     Details: Due to acuity of condition  External Data Reviewed: ECG.  Labs: ordered.  Radiology: ordered.  ECG/medicine tests: ordered and independent interpretation performed.  Discussion of management or test interpretation with external provider(s): D/w neuro.     Risk  OTC drugs.  Prescription drug management.  Decision regarding hospitalization.                 Disposition  Final diagnoses:   Right sided weakness   TIA (transient ischemic attack)     Time reflects when diagnosis was documented in both MDM as applicable and the Disposition within this note       Time User Action Codes Description Comment    8/23/2024 12:03 PM Alexandra Chan [R53.1] Right sided weakness     8/23/2024  1:00 PM Alexandra Chan [G45.9] TIA (transient ischemic attack)           ED Disposition       ED Disposition   Admit    Condition   Stable    Date/Time   Fri Aug 23, 2024 1300    Comment   Case was discussed with Dr. Connors and the patient's admission status was agreed to be Admission Status: observation status to the service of Dr. Connors .               Follow-up Information    None         Current Discharge Medication List        CONTINUE these medications which have NOT CHANGED    Details   brinzolamide (AZOPT) 1 % ophthalmic suspension Administer 1 drop to both eyes 2 (two) times a day      desmopressin (DDAVP) 0.1 mg tablet Take 0.1 mg by mouth daily      latanoprost (XALATAN) 0.005 % ophthalmic solution Administer 1 drop to both eyes daily at bedtime      omeprazole (PriLOSEC) 20 mg delayed release capsule Take 20 mg by mouth daily      acetaminophen (TYLENOL) 500 mg tablet Take 500 mg by mouth every 6 (six) hours as needed      brimonidine-timolol (COMBIGAN) 0.2-0.5 % Administer 1 drop to both eyes every 12 (twelve) hours       carbidopa-levodopa (SINEMET)  mg per tablet Take 1.5 tablets by mouth 3 (three) times a day  Refills: 0    Associated Diagnoses: Bipolar affective disorder, remission status unspecified (HCC)      citalopram (CeleXA) 10 mg tablet Take 10 mg by mouth daily      diphenhydrAMINE (BENADRYL) 25 mg tablet Take 25 mg by mouth as needed for itching      fluticasone (FLONASE) 50 mcg/act nasal spray 2 sprays into each nostril daily      polyethylene glycol (MIRALAX) 17 g packet Take 17 g by mouth daily             No discharge procedures on file.    PDMP Review       None            ED Provider  Electronically Signed by             Alexandra Chan PA-C  08/23/24 4781

## 2024-08-23 NOTE — TELEMEDICINE
TeleConsultation - Neurology   Damien May 44 y.o. male MRN: [id number]  Unit/Bed#: -01 Encounter: [id number]      VIRTUAL CARE DOCUMENTATION:     1. This service was provided via Telemedicine using [employer]     2. Parties in the room with patient during teleconsult Other: father, current caretaker     3. Confidentiality My office door was closed     4. Participants No one else was in the room    5. Patient acknowledged consent and understanding of privacy and security of the  Telemedicine consult. I informed the patient that I have reviewed their record in Epic and presented the opportunity for them to ask any questions regarding the visit today.  The patient agreed to participate.    6. Time spent 36 minutes       Assessment & Plan  Acute encephalopathy and worsening of rt sided weakness appears to be related to pain/lower bp.    Confusion and rue weakness and worsening of baseline rle weakness. Symptoms resolved in the ER. Pt also had dizziness and chest pain. No change in his baseline minimal speech production. No facial droop. BP running low 100s iniitally then 110s. Cardiac enzymes negative. Currently no complaints.  Unclear if fatigue related. Although cannot entirely rule out TIA there is also a possibility this could be behavioral.          Outpt neurosurgery eval for possible aneurysms  General neurology clinic follow up in 4-6 weeks with attending for 60 minutes.    History of Present Illness     Reason for Consult / Principal Problem: Stroke-like symptoms    HPI: Damien May is a 44 y.o.  male with MR, depression, ODD, who presents with stroke-like symptoms.    Patient was reportedly getting off the bus when he started falling backward and was unable to ambulate. He also then complained of chest and throat pain. He was noted to have R sided weakness in ED, which was reported to be new. Stroke alert was initiated in ED. NIHSS 4. BP on presentation 108/70. Patient was deemed  "not a TNK candidate due to resolving symptoms. CTH without acute infarct noted. It did note \"Basal ganglia mineralization, bilaterally, and extensive white matter hypodensities involving both cerebral hemispheres, greater than expected for the patient's age. These findings may represent Fahr's disease, a mitochondrial/dysmyelinating disorder, and endocrinopathy resulting in a calcium phosphate metabolism disorder, or alternatively the sequela of prior insult.\" CTA head/neck with no lvo or significant stenosis. There is mention of 1-2 mm possible aneurysms.    Per chart review, patient has history of ambulatory dysfunction and is on Sinemet for this per his outpatient neurologist at [facility]. Last office visit was in 10/2023.    Consult to Neurology  Consult performed by: Silverio Hutchinson MD  Consult ordered by: Alexandra Chan PA-C           Review of Systems  Cannot adequately obtain due to mentation. No apparent pain complaints.    Historical Information   Past Medical History:   Diagnosis Date    ADHD     Depression     Depression     Dysphagia     Incontinence     Intermittent explosive disorder     Mood disorder (HCC)     MR (mental retardation)     MR (mental retardation), moderate     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     Oppositional defiant disorder     Oppositional defiant disorder     Psychiatric disorder     Seasonal allergies     Thoracic scoliosis      History reviewed. No pertinent surgical history.  Social History   Social History     Substance and Sexual Activity   Alcohol Use No     Social History     Substance and Sexual Activity   Drug Use Not Currently     E-Cigarette/Vaping     E-Cigarette/Vaping Substances     Social History     Tobacco Use   Smoking Status Never   Smokeless Tobacco Never     Family History: History reviewed. No pertinent family history.    Review of previous medical records was completed.     Meds/Allergies   all current active meds have been reviewed, current " "meds:   Current Facility-Administered Medications   Medication Dose Route Frequency    atorvastatin (LIPITOR) tablet 40 mg  40 mg Oral QPM    clopidogrel (PLAVIX) tablet 300 mg  300 mg Oral Once   , and PTA meds:   Prior to Admission Medications   Prescriptions Last Dose Informant Patient Reported? Taking?   acetaminophen (TYLENOL) 500 mg tablet   Yes No   Sig: Take 500 mg by mouth every 6 (six) hours as needed   brimonidine-timolol (COMBIGAN) 0.2-0.5 %   Yes No   Sig: Administer 1 drop to both eyes every 12 (twelve) hours   carbidopa-levodopa (SINEMET)  mg per tablet   No No   Sig: Take 1.5 tablets by mouth 3 (three) times a day   citalopram (CeleXA) 10 mg tablet   Yes No   Sig: Take 10 mg by mouth daily   diphenhydrAMINE (BENADRYL) 25 mg tablet   Yes No   Sig: Take 25 mg by mouth as needed for itching   fluticasone (FLONASE) 50 mcg/act nasal spray   Yes No   Si sprays into each nostril daily   polyethylene glycol (MIRALAX) 17 g packet   Yes No   Sig: Take 17 g by mouth daily      Facility-Administered Medications: None       No Known Allergies    Objective   Vitals:Blood pressure 112/79, pulse 58, temperature (!) 97.4 °F (36.3 °C), resp. rate 17, SpO2 100%.,There is no height or weight on file to calculate BMI.  No intake or output data in the 24 hours ending 24 1351      Physical Exam  General: no acute distress  Extremities: no deformities    Neurologic Exam  MS: orientedX0. Tracking to father in room. Can follow certain commands some visual and some verbal. Able to say \"no\" when asking him if in pain. I asked him to count numbers and he gave me an incorrect number twice. He smiled on command and lifted up his extremities.    CN 2-12: no facial droop, dysarthria, or ophthalmoplegia.     Motor: elevated bilat UE  on command a bit short of shoulder height. From sitting position elevated both knees antigravity symmetrically.    Lab Results: I have personally reviewed pertinent reports.  , CBC: " "  Results from last 7 days   Lab Units 08/23/24  1145   WBC Thousand/uL 4.02*   RBC Million/uL 5.63*   HEMOGLOBIN g/dL 15.8   HEMATOCRIT % 49.7*   MCV fL 88   PLATELETS Thousands/uL 109*   , BMP/CMP:   Results from last 7 days   Lab Units 08/23/24  1145   SODIUM mmol/L 138   POTASSIUM mmol/L 4.4   CHLORIDE mmol/L 105   CO2 mmol/L 29   BUN mg/dL 22   CREATININE mg/dL 0.77   CALCIUM mg/dL 9.2   AST U/L 37   ALT U/L 4*   ALK PHOS U/L 85   EGFR ml/min/1.73sq m 110   , Vitamin B12:   , HgBA1C:   , TSH:   , Coagulation:   Results from last 7 days   Lab Units 08/23/24  1206   INR  1.03   , Lipid Profile:   , Ammonia:   , Urinalysis:       Invalid input(s): \"URIBILINOGEN\", Drug Screen:   , Medication Drug Levels:       Invalid input(s): \"CARBAMAZEPINE\", \"OXCARBAZEPINE\"    Imaging Studies: I have personally reviewed pertinent reports.   and I have personally reviewed pertinent films in PACS  EKG, Pathology, and Other Studies: I have personally reviewed pertinent reports.        "

## 2024-08-23 NOTE — SPEECH THERAPY NOTE
Speech Language/Pathology  Speech-Language Pathology Bedside Swallow Evaluation      Patient Name: Damien May    Today's Date: 8/23/2024     Problem List  Active Problems:    Stroke-like symptoms      Past Medical History  Past Medical History:   Diagnosis Date    ADHD     Depression     Depression     Dysphagia     Incontinence     Intermittent explosive disorder     Mood disorder (HCC)     MR (mental retardation)     MR (mental retardation), moderate     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     Oppositional defiant disorder     Oppositional defiant disorder     Psychiatric disorder     Seasonal allergies     Thoracic scoliosis        Past Surgical History  History reviewed. No pertinent surgical history.    Summary   Pt presents w/ suspected moderate oropharyngeal dysphagia.     Reduced labial seal for retrieval via cup sip w/ min R-sided anterior bolus loss, functional labial seal for retrieval and containment via tspn w/ no anterior bolus loss present. Prolonged rotary mastication and oral manipulation of mechanical soft textures w/ functional bolus formation and slowed bolus transfer. No oral residue noted. Suspected delayed swallow initiation and reduced hyolaryngeal elevation to palpation. No overt s/s of aspiration at this time.     Pt w/ increased risk of aspiration 2/2 stroke protocol and h/o dysphagia. SLP to f/u for diet tolerance as able and appropriate.     Recommended Diet: mechanically altered/level 2 diet and honey thick liquids   Recommended Form of Meds: crushed with puree   Aspiration precautions and swallowing strategies: upright posture, only feed when fully alert, slow rate of feeding, small bites/sips, and no straws  Other Recommendations: Continue frequent oral care, needs set up assistance         Current Medical Status  Pt is a 44 y.o. male w/ a PMHx significant for but not limited to hypotension, dysphagia, ambulatory dysfunction, bipolar disorder, intellectual disability  presenting to [facility] for falling backwards, difficulty ambulating, c/o hest pain, and throat pain. Pt not denying any pain. Pt has weakness to right arm and leg, new per staff member.     Current Precautions:  Fall  Aspiration      Allergies:  No known food allergies    Past medical history:  Please see H&P for details    Special Studies:  MRI pendin/23/24 CTA stroke alert (head/neck):  No acute intracranial abnormality.     Basal ganglia mineralization, bilaterally, and extensive white matter hypodensities involving both cerebral hemispheres, greater than expected for the patient's age. These findings may represent Fahr's disease, a mitochondrial/dysmyelinating disorder, and endocrinopathy resulting in a calcium phosphate metabolism disorder, or alternatively the sequela of prior insult. Correlation with the patient's past medical history is recommended.     24 CT stroke alert brain:  No large vessel occlusion, high-grade stenosis identified on CT angiogram of the head. 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     No hemodynamically significant stenosis or dissection identified on CT angiogram of the neck.    24 VFSS   DIET Recommendations: Level 5 Minced & Moist (dysphagia mechanically altered with Level 3 Moderately Thick Liquids (honey thick) via cup; NO straws  [x]Meds: Crushed in puree     [x] Frequent/Thorough Oral Care  [x] Repeat Video Swallow Study: As warranted    Aspiration Precautions:  [x]Upright at 90 degrees or OOB in chair for meals/medications   [x]Remain upright greater than 45 degrees for 30-60 min after P.O.  [x]No Straws   [x]No THIN water   [x]No ICE in drinks  [x]Supervision with P.O.: Close  [x]Assist with P.O.: As needed    Strategies:  [x]Take one bite/sip   [x]Small bites/sips  [x]Take multiple swallows per bite/sip     Penetration or Aspiration ([place] Penetration-Aspiration  Scale)  Consistencies without penetration or aspiration (PAS=1):  [x]Mildly Thick liquids: 5 mL  [x]Moderately Thick liquids: 5 mL  [x]Puree  [x]Soft Solids    Consistencies with penetration:   [x]Mildly Thick liquids  -Single Cup Sip: 3; Material enters the airway, remains above the vocal folds, and is not ejected from the airway  [x]Moderately Thick liquids  -Single/Successive Cup Sip: 2; Material enters the airway, remains above the vocal folds, and is ejected from the airway     Consistencies with aspiration:  [x]Thin liquids  -5 mL: 8; Material enters the airway, passes below the vocal folds, and no effort is made to eject  [x]Mildly Thick liquids  -Successive Cup Sips: 6; Material enters the airway, passes below the vocal folds, and is ejected into the larynx or out of the airway       Social/Education/Vocational Hx:  Pt lives in group home    Swallow Information   Current Diet: diet not listed in EPIC   Baseline Diet: mechanically altered/level 2 diet and honey thick liquids per recent VFSS 7/16/24       Baseline Assessment   Behavior/Cognition: alert  Speech/Language Status: able to participate in basic conversation - limited and able to follow commands inconsistently- benefits from direct modeling   Patient Positioning: upright in bed  Pain Status/Interventions/Response to Interventions: No report of or nonverbal indications of pain.       Swallow Mechanism Exam  Facial: symmetrical  Labial: WFL  Lingual: WFL  Velum: symmetrical  Mandible: adequate ROM  Dentition: adequate  Vocal quality:clear/adequate   Volitional Cough: weak   Respiratory Status: on RA       Consistencies Assessed and Performance   Consistencies Administered: honey thick, puree, and mechanical soft solids- deferred higher level trials 2/2 pt's baseline diet per VFSS     Oral Stage:   Reduced labial seal for retrieval via cup sip w/ min R-sided anterior bolus loss, functional labial seal for retrieval and containment via tspn w/ no  anterior bolus loss present. Prolonged rotary mastication and oral manipulation of mechanical soft textures w/ functional bolus formation and slowed bolus transfer. No oral residue noted.     Pharyngeal Stage:   Suspected delayed swallow initiation and reduced hyolaryngeal elevation to palpation. No overt s/s of aspiration at this time.     Esophageal Concerns: none reported      Summary and Recommendations (see above)    Results Reviewed with: patient, RN, and MD     Treatment Recommended: as able and appropriate, pt at elast restrictive diet, f/u x1, monitor MRI      Dysphagia LTG  -Patient will demonstrate safe and effective oral intake (without overt s/s significant oral/pharyngeal dysphagia including s/s penetration or aspiration) for the highest appropriate diet level.     Short Term Goals:  -Pt will tolerate Dysphagia 1/pureed diet and honey thick nectar thick thin liquid with no significant s/s oral or pharyngeal dysphagia across 1-3 diagnostic session/s    -Pt will tolerate Dysphagia 2/mechanical soft diet and honey thick nectar thick thin liquid with no significant s/s oral or pharyngeal dysphagia across 1-3 diagnostic session/s.    -Pt will tolerate Dysphagia 3/advanced (dental soft) diet and honey thick nectar thick thin liquid with no significant s/s oral or pharyngeal dysphagia across 1-3 diagnostic session/s.    -Patient will tolerate trials of upgraded food and/or liquid texture with no significant s/s of oral or pharyngeal dysphagia including aspiration across 1-3 diagnostic sessions     -Patient will comply with a Video/Modified Barium Swallow study for more complete assessment of swallowing anatomy/physiology/aspiration risk and to assess efficacy of treatment techniques so as to best guide treatment plan    Speech Therapy Prognosis   Prognosis: fair    Prognosis Considerations: medical status, prior medical history, and cognitive status

## 2024-08-23 NOTE — ASSESSMENT & PLAN NOTE
Complaining of dizziness, ambulatory dysfunction  PT/OT evaluation  Dad insisted discharge after negative stroke  Will need ambulation prior to discharge  Patient walked with dad, developed chest pain

## 2024-08-24 ENCOUNTER — APPOINTMENT (OUTPATIENT)
Dept: RADIOLOGY | Facility: HOSPITAL | Age: 44
End: 2024-08-24
Payer: COMMERCIAL

## 2024-08-24 VITALS
HEART RATE: 74 BPM | DIASTOLIC BLOOD PRESSURE: 78 MMHG | OXYGEN SATURATION: 97 % | TEMPERATURE: 97.7 F | SYSTOLIC BLOOD PRESSURE: 120 MMHG | RESPIRATION RATE: 20 BRPM

## 2024-08-24 PROBLEM — R93.89 ABNORMAL COMPUTED TOMOGRAPHY ANGIOGRAPHY (CTA): Status: ACTIVE | Noted: 2024-08-24

## 2024-08-24 LAB
ALBUMIN SERPL BCG-MCNC: 4.3 G/DL (ref 3.5–5)
ALP SERPL-CCNC: 99 U/L (ref 34–104)
ALT SERPL W P-5'-P-CCNC: 17 U/L (ref 7–52)
ANION GAP SERPL CALCULATED.3IONS-SCNC: 6 MMOL/L (ref 4–13)
AST SERPL W P-5'-P-CCNC: 32 U/L (ref 13–39)
BASOPHILS # BLD AUTO: 0.01 THOUSANDS/ÂΜL (ref 0–0.1)
BASOPHILS NFR BLD AUTO: 0 % (ref 0–1)
BILIRUB SERPL-MCNC: 0.63 MG/DL (ref 0.2–1)
BUN SERPL-MCNC: 14 MG/DL (ref 5–25)
CALCIUM SERPL-MCNC: 9.8 MG/DL (ref 8.4–10.2)
CARDIAC TROPONIN I PNL SERPL HS: 3 NG/L (ref 8–18)
CHLORIDE SERPL-SCNC: 98 MMOL/L (ref 96–108)
CHOLEST SERPL-MCNC: 187 MG/DL
CO2 SERPL-SCNC: 32 MMOL/L (ref 21–32)
CREAT SERPL-MCNC: 0.91 MG/DL (ref 0.6–1.3)
EOSINOPHIL # BLD AUTO: 0.07 THOUSAND/ÂΜL (ref 0–0.61)
EOSINOPHIL NFR BLD AUTO: 2 % (ref 0–6)
ERYTHROCYTE [DISTWIDTH] IN BLOOD BY AUTOMATED COUNT: 13.1 % (ref 11.6–15.1)
EST. AVERAGE GLUCOSE BLD GHB EST-MCNC: 105 MG/DL
GFR SERPL CREATININE-BSD FRML MDRD: 102 ML/MIN/1.73SQ M
GLUCOSE SERPL-MCNC: 109 MG/DL (ref 65–140)
HBA1C MFR BLD: 5.3 %
HCT VFR BLD AUTO: 50.3 % (ref 36.5–49.3)
HDLC SERPL-MCNC: 57 MG/DL
HGB BLD-MCNC: 16.2 G/DL (ref 12–17)
IMM GRANULOCYTES # BLD AUTO: 0.03 THOUSAND/UL (ref 0–0.2)
IMM GRANULOCYTES NFR BLD AUTO: 1 % (ref 0–2)
LDLC SERPL CALC-MCNC: 110 MG/DL (ref 0–100)
LYMPHOCYTES # BLD AUTO: 0.74 THOUSANDS/ÂΜL (ref 0.6–4.47)
LYMPHOCYTES NFR BLD AUTO: 17 % (ref 14–44)
MCH RBC QN AUTO: 28.1 PG (ref 26.8–34.3)
MCHC RBC AUTO-ENTMCNC: 32.2 G/DL (ref 31.4–37.4)
MCV RBC AUTO: 87 FL (ref 82–98)
MONOCYTES # BLD AUTO: 0.57 THOUSAND/ÂΜL (ref 0.17–1.22)
MONOCYTES NFR BLD AUTO: 13 % (ref 4–12)
NEUTROPHILS # BLD AUTO: 2.87 THOUSANDS/ÂΜL (ref 1.85–7.62)
NEUTS SEG NFR BLD AUTO: 67 % (ref 43–75)
NRBC BLD AUTO-RTO: 0 /100 WBCS
PLATELET # BLD AUTO: 121 THOUSANDS/UL (ref 149–390)
POTASSIUM SERPL-SCNC: 4.7 MMOL/L (ref 3.5–5.3)
PROT SERPL-MCNC: 7.7 G/DL (ref 6.4–8.4)
RBC # BLD AUTO: 5.76 MILLION/UL (ref 3.88–5.62)
SODIUM SERPL-SCNC: 136 MMOL/L (ref 135–147)
TRIGL SERPL-MCNC: 98 MG/DL
WBC # BLD AUTO: 4.29 THOUSAND/UL (ref 4.31–10.16)

## 2024-08-24 PROCEDURE — 83036 HEMOGLOBIN GLYCOSYLATED A1C: CPT | Performed by: INTERNAL MEDICINE

## 2024-08-24 PROCEDURE — 87081 CULTURE SCREEN ONLY: CPT | Performed by: INTERNAL MEDICINE

## 2024-08-24 PROCEDURE — 97116 GAIT TRAINING THERAPY: CPT

## 2024-08-24 PROCEDURE — 73560 X-RAY EXAM OF KNEE 1 OR 2: CPT

## 2024-08-24 PROCEDURE — 80053 COMPREHEN METABOLIC PANEL: CPT | Performed by: INTERNAL MEDICINE

## 2024-08-24 PROCEDURE — 85025 COMPLETE CBC W/AUTO DIFF WBC: CPT | Performed by: INTERNAL MEDICINE

## 2024-08-24 PROCEDURE — 92526 ORAL FUNCTION THERAPY: CPT

## 2024-08-24 PROCEDURE — 73502 X-RAY EXAM HIP UNI 2-3 VIEWS: CPT

## 2024-08-24 PROCEDURE — 80061 LIPID PANEL: CPT | Performed by: INTERNAL MEDICINE

## 2024-08-24 PROCEDURE — 84484 ASSAY OF TROPONIN QUANT: CPT | Performed by: INTERNAL MEDICINE

## 2024-08-24 PROCEDURE — 97163 PT EVAL HIGH COMPLEX 45 MIN: CPT

## 2024-08-24 PROCEDURE — 73610 X-RAY EXAM OF ANKLE: CPT

## 2024-08-24 RX ADMIN — CARBIDOPA AND LEVODOPA 1.5 TABLET: 25; 100 TABLET ORAL at 09:43

## 2024-08-24 RX ADMIN — DESMOPRESSIN ACETATE 0.1 MG: 0.1 TABLET ORAL at 09:43

## 2024-08-24 RX ADMIN — PANTOPRAZOLE SODIUM 40 MG: 40 TABLET, DELAYED RELEASE ORAL at 05:41

## 2024-08-24 RX ADMIN — CITALOPRAM HYDROBROMIDE 10 MG: 10 TABLET ORAL at 09:43

## 2024-08-24 NOTE — PROGRESS NOTES
Patient:  ZANE LIN    MRN:  [id number]    [facility] Request ID:  [id number]    Level of care reserved:  Home Health Agency    Partner Reserved:  [facility] [place] [address] [phone]    Clinical needs requested:    Geography searched:  [phone]    Start of Service:    Request sent:  12:47pm EDT on 8/24/2024 by Sarah Rolle    Partner reserved:  1:13pm EDT on 8/24/2024 by Sarah Rolle    Choice list shared:

## 2024-08-24 NOTE — NURSING NOTE
Patient discharged back to his group home. Patient left the unit via wheelchair by RN and staff from [facility]. Verbal report and discharge education provided to the [facility] staff. Staff verbalized understanding.

## 2024-08-24 NOTE — PLAN OF CARE
Problem: Potential for Falls  Goal: Patient will remain free of falls  Description: INTERVENTIONS:  - Educate patient/family on patient safety including physical limitations  - Instruct patient to call for assistance with activity   - Consult OT/PT to assist with strengthening/mobility   - Keep Call bell within reach  - Keep bed low and locked with side rails adjusted as appropriate  - Keep care items and personal belongings within reach  - Initiate and maintain comfort rounds  - Make Fall Risk Sign visible to staff  Problem: PAIN - ADULT  Goal: Verbalizes/displays adequate comfort level or baseline comfort level  Description: Interventions:  - Encourage patient to monitor pain and request assistance  - Assess pain using appropriate pain scale  - Administer analgesics based on type and severity of pain and evaluate response  - Implement non-pharmacological measures as appropriate and evaluate response  - Consider cultural and social influences on pain and pain management  - Notify physician/advanced practitioner if interventions unsuccessful or patient reports new pain  Outcome: Progressing     Problem: INFECTION - ADULT  Goal: Absence or prevention of progression during hospitalization  Description: INTERVENTIONS:  - Assess and monitor for signs and symptoms of infection  - Monitor lab/diagnostic results  - Monitor all insertion sites, i.e. indwelling lines, tubes, and drains  - Monitor endotracheal if appropriate and nasal secretions for changes in amount and color  - [place] appropriate cooling/warming therapies per order  - Administer medications as ordered  - Instruct and encourage patient and family to use good hand hygiene technique  - Identify and instruct in appropriate isolation precautions for identified infection/condition  Outcome: Progressing     Problem: Knowledge Deficit  Goal: Patient/family/caregiver demonstrates understanding of disease process, treatment plan, medications, and discharge  instructions  Description: Complete learning assessment and assess knowledge base.  Interventions:  - Provide teaching at level of understanding  - Provide teaching via preferred learning methods  Outcome: Progressing     - Apply yellow socks and bracelet for high fall risk patients  - Consider moving patient to room near nurses station  Outcome: Progressing

## 2024-08-24 NOTE — PLAN OF CARE
Problem: Potential for Falls  Goal: Patient will remain free of falls  Description: INTERVENTIONS:  - Educate patient/family on patient safety including physical limitations  - Instruct patient to call for assistance with activity   - Consult OT/PT to assist with strengthening/mobility   - Keep Call bell within reach  - Keep bed low and locked with side rails adjusted as appropriate  - Keep care items and personal belongings within reach  - Initiate and maintain comfort rounds  - Make Fall Risk Sign visible to staff  - Offer Toileting every 3 Hours, in advance of need  - Initiate/Maintain bed alarm  - Obtain necessary fall risk management equipment: n/a  - Apply yellow socks and bracelet for high fall risk patients  - Consider moving patient to room near nurses station  8/24/2024 1513 by Mia Asencio  Outcome: Adequate for Discharge  8/24/2024 1140 by Mia Asencio  Outcome: Progressing     Problem: PAIN - ADULT  Goal: Verbalizes/displays adequate comfort level or baseline comfort level  Description: Interventions:  - Encourage patient to monitor pain and request assistance  - Assess pain using appropriate pain scale  - Administer analgesics based on type and severity of pain and evaluate response  - Implement non-pharmacological measures as appropriate and evaluate response  - Consider cultural and social influences on pain and pain management  - Notify physician/advanced practitioner if interventions unsuccessful or patient reports new pain  8/24/2024 1513 by Mia Asencio  Outcome: Adequate for Discharge  8/24/2024 1140 by Mia Asencio  Outcome: Progressing     Problem: INFECTION - ADULT  Goal: Absence or prevention of progression during hospitalization  Description: INTERVENTIONS:  - Assess and monitor for signs and symptoms of infection  - Monitor lab/diagnostic results  - Monitor all insertion sites, i.e. indwelling lines, tubes, and drains  - Monitor endotracheal if appropriate and nasal  secretions for changes in amount and color  - [place] appropriate cooling/warming therapies per order  - Administer medications as ordered  - Instruct and encourage patient and family to use good hand hygiene technique  - Identify and instruct in appropriate isolation precautions for identified infection/condition  8/24/2024 1513 by Mia Asencio  Outcome: Adequate for Discharge  8/24/2024 1140 by Mia Asencio  Outcome: Progressing     Problem: SAFETY ADULT  Goal: Patient will remain free of falls  Description: INTERVENTIONS:  - Educate patient/family on patient safety including physical limitations  - Instruct patient to call for assistance with activity   - Consult OT/PT to assist with strengthening/mobility   - Keep Call bell within reach  - Keep bed low and locked with side rails adjusted as appropriate  - Keep care items and personal belongings within reach  - Initiate and maintain comfort rounds  - Make Fall Risk Sign visible to staff  - Offer Toileting every 3 Hours, in advance of need  - Initiate/Maintain bed alarm  - Obtain necessary fall risk management equipment: n/a  - Apply yellow socks and bracelet for high fall risk patients  - Consider moving patient to room near nurses station  8/24/2024 1513 by Mia Asencio  Outcome: Adequate for Discharge  8/24/2024 1140 by Mia Asencio  Outcome: Progressing  Goal: Maintain or return to baseline ADL function  Description: INTERVENTIONS:  -  Assess patient's ability to carry out ADLs; assess patient's baseline for ADL function and identify physical deficits which impact ability to perform ADLs (bathing, care of mouth/teeth, toileting, grooming, dressing, etc.)  - Assess/evaluate cause of self-care deficits   - Assess range of motion  - Assess patient's mobility; develop plan if impaired  - Assess patient's need for assistive devices and provide as appropriate  - Encourage maximum independence but intervene and supervise when necessary  - Involve  family in performance of ADLs  - Assess for home care needs following discharge   - Consider OT consult to assist with ADL evaluation and planning for discharge  - Provide patient education as appropriate  8/24/2024 1513 by Mia Asencio  Outcome: Adequate for Discharge  8/24/2024 1140 by Mia Asencio  Outcome: Progressing  Goal: Maintains/Returns to pre admission functional level  Description: INTERVENTIONS:  - Perform AM-PAC 6 Click Basic Mobility/ Daily Activity assessment daily.  - Set and communicate daily mobility goal to care team and patient/family/caregiver.   - Collaborate with rehabilitation services on mobility goals if consulted  - Perform Range of Motion 3 times a day.  - Reposition patient every 3 hours.  - Dangle patient 3 times a day  - Stand patient 3 times a day  - Ambulate patient 3 times a day  - Out of bed to chair 3 times a day   - Out of bed for meals 3 times a day  - Out of bed for toileting  - Record patient progress and toleration of activity level   8/24/2024 1513 by Mia Asencio  Outcome: Adequate for Discharge  8/24/2024 1140 by Mia Asencio  Outcome: Progressing     Problem: DISCHARGE PLANNING  Goal: Discharge to home or other facility with appropriate resources  Description: INTERVENTIONS:  - Identify barriers to discharge w/patient and caregiver  - Arrange for needed discharge resources and transportation as appropriate  - Identify discharge learning needs (meds, wound care, etc.)  - Arrange for interpretive services to assist at discharge as needed  - Refer to Case Management Department for coordinating discharge planning if the patient needs post-hospital services based on physician/advanced practitioner order or complex needs related to functional status, cognitive ability, or social support system  8/24/2024 1513 by Mia Asencio  Outcome: Adequate for Discharge  8/24/2024 1140 by Mia Asencio  Outcome: Progressing     Problem: Knowledge Deficit  Goal:  Patient/family/caregiver demonstrates understanding of disease process, treatment plan, medications, and discharge instructions  Description: Complete learning assessment and assess knowledge base.  Interventions:  - Provide teaching at level of understanding  - Provide teaching via preferred learning methods  8/24/2024 1513 by Mia Asencio  Outcome: Adequate for Discharge  8/24/2024 1140 by Mia Asencio  Outcome: Progressing     Problem: NEUROSENSORY - ADULT  Goal: Achieves stable or improved neurological status  Description: INTERVENTIONS  - Monitor and report changes in neurological status  - Monitor vital signs such as temperature, blood pressure, glucose, and any other labs ordered   - Initiate measures to prevent increased intracranial pressure  - Monitor for seizure activity and implement precautions if appropriate      8/24/2024 1513 by Mia Asencio  Outcome: Adequate for Discharge  8/24/2024 1140 by Mia Asencio  Outcome: Progressing  Goal: Remains free of injury related to seizures activity  Description: INTERVENTIONS  - Maintain airway, patient safety  and administer oxygen as ordered  - Monitor patient for seizure activity, document and report duration and description of seizure to physician/advanced practitioner  - If seizure occurs,  ensure patient safety during seizure  - Reorient patient post seizure  - Seizure pads on all 4 side rails  - Instruct patient/family to notify RN of any seizure activity including if an aura is experienced  - Instruct patient/family to call for assistance with activity based on nursing assessment  - Administer anti-seizure medications if ordered    8/24/2024 1513 by Mia Asencio  Outcome: Adequate for Discharge  8/24/2024 1140 by Mia Asencio  Outcome: Progressing  Goal: Achieves maximal functionality and self care  Description: INTERVENTIONS  - Monitor swallowing and airway patency with patient fatigue and changes in neurological status  -  Encourage and assist patient to increase activity and self care.   - Encourage visually impaired, hearing impaired and aphasic patients to use assistive/communication devices  8/24/2024 1513 by Mia Asencio  Outcome: Adequate for Discharge  8/24/2024 1140 by Mia Asencio  Outcome: Progressing     Problem: CARDIOVASCULAR - ADULT  Goal: Maintains optimal cardiac output and hemodynamic stability  Description: INTERVENTIONS:  - Monitor I/O, vital signs and rhythm  - Monitor for S/S and trends of decreased cardiac output  - Administer and titrate ordered vasoactive medications to optimize hemodynamic stability  - Assess quality of pulses, skin color and temperature  - Assess for signs of decreased coronary artery perfusion  - Instruct patient to report change in severity of symptoms  8/24/2024 1513 by Mia Asencio  Outcome: Adequate for Discharge  8/24/2024 1140 by Mia Asencio  Outcome: Progressing  Goal: Absence of cardiac dysrhythmias or at baseline rhythm  Description: INTERVENTIONS:  - Continuous cardiac monitoring, vital signs, obtain 12 lead EKG if ordered  - Administer antiarrhythmic and heart rate control medications as ordered  - Monitor electrolytes and administer replacement therapy as ordered  8/24/2024 1513 by Mia Asencio  Outcome: Adequate for Discharge  8/24/2024 1140 by Mia Asencio  Outcome: Progressing     Problem: METABOLIC, FLUID AND ELECTROLYTES - ADULT  Goal: Electrolytes maintained within normal limits  Description: INTERVENTIONS:  - Monitor labs and assess patient for signs and symptoms of electrolyte imbalances  - Administer electrolyte replacement as ordered  - Monitor response to electrolyte replacements, including repeat lab results as appropriate  - Instruct patient on fluid and nutrition as appropriate  8/24/2024 1513 by Mia Asencio  Outcome: Adequate for Discharge  8/24/2024 1140 by Mia Asencio  Outcome: Progressing  Goal: Fluid balance  maintained  Description: INTERVENTIONS:  - Monitor labs   - Monitor I/O and WT  - Instruct patient on fluid and nutrition as appropriate  - Assess for signs & symptoms of volume excess or deficit  8/24/2024 1513 by Mia Asencio  Outcome: Adequate for Discharge  8/24/2024 1140 by Mia Asencio  Outcome: Progressing  Goal: Glucose maintained within target range  Description: INTERVENTIONS:  - Monitor Blood Glucose as ordered  - Assess for signs and symptoms of hyperglycemia and hypoglycemia  - Administer ordered medications to maintain glucose within target range  - Assess nutritional intake and initiate nutrition service referral as needed  8/24/2024 1513 by Mia Asencio  Outcome: Adequate for Discharge  8/24/2024 1140 by Mia Asencio  Outcome: Progressing     Problem: MUSCULOSKELETAL - ADULT  Goal: Maintain or return mobility to safest level of function  Description: INTERVENTIONS:  - Assess patient's ability to carry out ADLs; assess patient's baseline for ADL function and identify physical deficits which impact ability to perform ADLs (bathing, care of mouth/teeth, toileting, grooming, dressing, etc.)  - Assess/evaluate cause of self-care deficits   - Assess range of motion  - Assess patient's mobility  - Assess patient's need for assistive devices and provide as appropriate  - Encourage maximum independence but intervene and supervise when necessary  - Involve family in performance of ADLs  - Assess for home care needs following discharge   - Consider OT consult to assist with ADL evaluation and planning for discharge  - Provide patient education as appropriate  8/24/2024 1513 by Mia Asencio  Outcome: Adequate for Discharge  8/24/2024 1140 by Mia Asencio  Outcome: Progressing  Goal: Maintain proper alignment of affected body part  Description: INTERVENTIONS:  - Support, maintain and protect limb and body alignment  - Provide patient/ family with appropriate education  8/24/2024 1513 by  Mia Asencio  Outcome: Adequate for Discharge  8/24/2024 1140 by Mia Asencio  Outcome: Progressing     Problem: Prexisting or High Potential for Compromised Skin Integrity  Goal: Skin integrity is maintained or improved  Description: INTERVENTIONS:  - Identify patients at risk for skin breakdown  - Assess and monitor skin integrity  - Assess and monitor nutrition and hydration status  - Monitor labs   - Assess for incontinence   - Turn and reposition patient  - Assist with mobility/ambulation  - Relieve pressure over bony prominences  - Avoid friction and shearing  - Provide appropriate hygiene as needed including keeping skin clean and dry  - Evaluate need for skin moisturizer/barrier cream  - Collaborate with interdisciplinary team   - Patient/family teaching  - Consider wound care consult   8/24/2024 1513 by Mia Asencio  Outcome: Adequate for Discharge  8/24/2024 1140 by Mia Asencio  Outcome: Progressing     Problem: PHYSICAL THERAPY ADULT  Goal: Performs mobility at highest level of function for planned discharge setting.  See evaluation for individualized goals.  Description: Treatment/Interventions: Functional transfer training, LE strengthening/ROM, Therapeutic exercise, Endurance training, Cognitive reorientation, Patient/family training, Equipment eval/education, Bed mobility, Gait training  Equipment Recommended: Walker (may benefit from continued trials w/ RW vs HHA (?))       See flowsheet documentation for full assessment, interventions and recommendations.  Outcome: Adequate for Discharge

## 2024-08-24 NOTE — PLAN OF CARE
Problem: Potential for Falls  Goal: Patient will remain free of falls  Description: INTERVENTIONS:  - Educate patient/family on patient safety including physical limitations  - Instruct patient to call for assistance with activity   - Consult OT/PT to assist with strengthening/mobility   - Keep Call bell within reach  - Keep bed low and locked with side rails adjusted as appropriate  - Keep care items and personal belongings within reach  - Initiate and maintain comfort rounds  - Make Fall Risk Sign visible to staff  - Offer Toileting every 3 Hours, in advance of need  - Initiate/Maintain bed alarm  - Obtain necessary fall risk management equipment: n/a  - Apply yellow socks and bracelet for high fall risk patients  - Consider moving patient to room near nurses station  Outcome: Progressing     Problem: PAIN - ADULT  Goal: Verbalizes/displays adequate comfort level or baseline comfort level  Description: Interventions:  - Encourage patient to monitor pain and request assistance  - Assess pain using appropriate pain scale  - Administer analgesics based on type and severity of pain and evaluate response  - Implement non-pharmacological measures as appropriate and evaluate response  - Consider cultural and social influences on pain and pain management  - Notify physician/advanced practitioner if interventions unsuccessful or patient reports new pain  Outcome: Progressing     Problem: INFECTION - ADULT  Goal: Absence or prevention of progression during hospitalization  Description: INTERVENTIONS:  - Assess and monitor for signs and symptoms of infection  - Monitor lab/diagnostic results  - Monitor all insertion sites, i.e. indwelling lines, tubes, and drains  - Monitor endotracheal if appropriate and nasal secretions for changes in amount and color  - [place] appropriate cooling/warming therapies per order  - Administer medications as ordered  - Instruct and encourage patient and family to use good hand hygiene  technique  - Identify and instruct in appropriate isolation precautions for identified infection/condition  Outcome: Progressing     Problem: SAFETY ADULT  Goal: Patient will remain free of falls  Description: INTERVENTIONS:  - Educate patient/family on patient safety including physical limitations  - Instruct patient to call for assistance with activity   - Consult OT/PT to assist with strengthening/mobility   - Keep Call bell within reach  - Keep bed low and locked with side rails adjusted as appropriate  - Keep care items and personal belongings within reach  - Initiate and maintain comfort rounds  - Make Fall Risk Sign visible to staff  - Offer Toileting every 3 Hours, in advance of need  - Initiate/Maintain bed alarm  - Obtain necessary fall risk management equipment: n/a  - Apply yellow socks and bracelet for high fall risk patients  - Consider moving patient to room near nurses station  Outcome: Progressing  Goal: Maintain or return to baseline ADL function  Description: INTERVENTIONS:  -  Assess patient's ability to carry out ADLs; assess patient's baseline for ADL function and identify physical deficits which impact ability to perform ADLs (bathing, care of mouth/teeth, toileting, grooming, dressing, etc.)  - Assess/evaluate cause of self-care deficits   - Assess range of motion  - Assess patient's mobility; develop plan if impaired  - Assess patient's need for assistive devices and provide as appropriate  - Encourage maximum independence but intervene and supervise when necessary  - Involve family in performance of ADLs  - Assess for home care needs following discharge   - Consider OT consult to assist with ADL evaluation and planning for discharge  - Provide patient education as appropriate  Outcome: Progressing  Goal: Maintains/Returns to pre admission functional level  Description: INTERVENTIONS:  - Perform AM-PAC 6 Click Basic Mobility/ Daily Activity assessment daily.  - Set and communicate daily  mobility goal to care team and patient/family/caregiver.   - Collaborate with rehabilitation services on mobility goals if consulted  - Perform Range of Motion 3 times a day.  - Reposition patient every 3 hours.  - Dangle patient 3 times a day  - Stand patient 3 times a day  - Ambulate patient 3 times a day  - Out of bed to chair 3 times a day   - Out of bed for meals 3 times a day  - Out of bed for toileting  - Record patient progress and toleration of activity level   Outcome: Progressing     Problem: DISCHARGE PLANNING  Goal: Discharge to home or other facility with appropriate resources  Description: INTERVENTIONS:  - Identify barriers to discharge w/patient and caregiver  - Arrange for needed discharge resources and transportation as appropriate  - Identify discharge learning needs (meds, wound care, etc.)  - Arrange for interpretive services to assist at discharge as needed  - Refer to Case Management Department for coordinating discharge planning if the patient needs post-hospital services based on physician/advanced practitioner order or complex needs related to functional status, cognitive ability, or social support system  Outcome: Progressing     Problem: Knowledge Deficit  Goal: Patient/family/caregiver demonstrates understanding of disease process, treatment plan, medications, and discharge instructions  Description: Complete learning assessment and assess knowledge base.  Interventions:  - Provide teaching at level of understanding  - Provide teaching via preferred learning methods  Outcome: Progressing     Problem: NEUROSENSORY - ADULT  Goal: Achieves stable or improved neurological status  Description: INTERVENTIONS  - Monitor and report changes in neurological status  - Monitor vital signs such as temperature, blood pressure, glucose, and any other labs ordered   - Initiate measures to prevent increased intracranial pressure  - Monitor for seizure activity and implement precautions if appropriate       Outcome: Progressing  Goal: Remains free of injury related to seizures activity  Description: INTERVENTIONS  - Maintain airway, patient safety  and administer oxygen as ordered  - Monitor patient for seizure activity, document and report duration and description of seizure to physician/advanced practitioner  - If seizure occurs,  ensure patient safety during seizure  - Reorient patient post seizure  - Seizure pads on all 4 side rails  - Instruct patient/family to notify RN of any seizure activity including if an aura is experienced  - Instruct patient/family to call for assistance with activity based on nursing assessment  - Administer anti-seizure medications if ordered    Outcome: Progressing  Goal: Achieves maximal functionality and self care  Description: INTERVENTIONS  - Monitor swallowing and airway patency with patient fatigue and changes in neurological status  - Encourage and assist patient to increase activity and self care.   - Encourage visually impaired, hearing impaired and aphasic patients to use assistive/communication devices  Outcome: Progressing     Problem: CARDIOVASCULAR - ADULT  Goal: Maintains optimal cardiac output and hemodynamic stability  Description: INTERVENTIONS:  - Monitor I/O, vital signs and rhythm  - Monitor for S/S and trends of decreased cardiac output  - Administer and titrate ordered vasoactive medications to optimize hemodynamic stability  - Assess quality of pulses, skin color and temperature  - Assess for signs of decreased coronary artery perfusion  - Instruct patient to report change in severity of symptoms  Outcome: Progressing  Goal: Absence of cardiac dysrhythmias or at baseline rhythm  Description: INTERVENTIONS:  - Continuous cardiac monitoring, vital signs, obtain 12 lead EKG if ordered  - Administer antiarrhythmic and heart rate control medications as ordered  - Monitor electrolytes and administer replacement therapy as ordered  Outcome: Progressing     Problem:  METABOLIC, FLUID AND ELECTROLYTES - ADULT  Goal: Electrolytes maintained within normal limits  Description: INTERVENTIONS:  - Monitor labs and assess patient for signs and symptoms of electrolyte imbalances  - Administer electrolyte replacement as ordered  - Monitor response to electrolyte replacements, including repeat lab results as appropriate  - Instruct patient on fluid and nutrition as appropriate  Outcome: Progressing  Goal: Fluid balance maintained  Description: INTERVENTIONS:  - Monitor labs   - Monitor I/O and WT  - Instruct patient on fluid and nutrition as appropriate  - Assess for signs & symptoms of volume excess or deficit  Outcome: Progressing  Goal: Glucose maintained within target range  Description: INTERVENTIONS:  - Monitor Blood Glucose as ordered  - Assess for signs and symptoms of hyperglycemia and hypoglycemia  - Administer ordered medications to maintain glucose within target range  - Assess nutritional intake and initiate nutrition service referral as needed  Outcome: Progressing     Problem: MUSCULOSKELETAL - ADULT  Goal: Maintain or return mobility to safest level of function  Description: INTERVENTIONS:  - Assess patient's ability to carry out ADLs; assess patient's baseline for ADL function and identify physical deficits which impact ability to perform ADLs (bathing, care of mouth/teeth, toileting, grooming, dressing, etc.)  - Assess/evaluate cause of self-care deficits   - Assess range of motion  - Assess patient's mobility  - Assess patient's need for assistive devices and provide as appropriate  - Encourage maximum independence but intervene and supervise when necessary  - Involve family in performance of ADLs  - Assess for home care needs following discharge   - Consider OT consult to assist with ADL evaluation and planning for discharge  - Provide patient education as appropriate  Outcome: Progressing  Goal: Maintain proper alignment of affected body part  Description:  INTERVENTIONS:  - Support, maintain and protect limb and body alignment  - Provide patient/ family with appropriate education  Outcome: Progressing     Problem: Prexisting or High Potential for Compromised Skin Integrity  Goal: Skin integrity is maintained or improved  Description: INTERVENTIONS:  - Identify patients at risk for skin breakdown  - Assess and monitor skin integrity  - Assess and monitor nutrition and hydration status  - Monitor labs   - Assess for incontinence   - Turn and reposition patient  - Assist with mobility/ambulation  - Relieve pressure over bony prominences  - Avoid friction and shearing  - Provide appropriate hygiene as needed including keeping skin clean and dry  - Evaluate need for skin moisturizer/barrier cream  - Collaborate with interdisciplinary team   - Patient/family teaching  - Consider wound care consult   Outcome: Progressing

## 2024-08-24 NOTE — SPEECH THERAPY NOTE
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 No pertinent surgical history.      Subjective:  Pt awake and alert with sister at bedside. Pt being fed mech soft/honey thick breakfast.    Current Diet:  Mech soft/honey thick     Objective:  Pt seen for dx dysphagia tx to assess diet tolerance. Pt's sister reports pt has been  dfoing wel w/ breakfast thus far this am. Pt denies c/o current diet texture and agreeable to ongoing trials. Adequate labial seal for retrieval and oral containment. Slowed mastication w/ delayed bolus formation and transfer. Pt ultimately able to transfer all material without oral residue. Swallows suspected delayed w/ reduced rise to palpation. No overt s/s aspiration across trials. Advanced consistency liquids not trialed at this time 2/2 h/o silent aspiration on VBS 7/16/24. Pt also declines trials of advanced texture solids at this time.      Assessment:  Pt w/ functional appearing oropharyngeal swallow skill for current/baseline diet mech soft/honey thick liquids. No overt s/s aspiration.     Plan/Recommendations:  Continue current diet as tolerated  Frequent/thorough oral care  ST to f/u as able/appropriate, pt would also likely benefit from post-acute ST services

## 2024-08-24 NOTE — PHYSICAL THERAPY NOTE
PHYSICAL THERAPY EVALUATION NOTE        Patient Name: Damien May  Today's Date: 2024    AGE:   44 y.o.  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     Oppositional defiant disorder     Oppositional defiant disorder     Psychiatric disorder     Seasonal allergies     Thoracic scoliosis      Length Of Stay: 0  PHYSICAL THERAPY EVALUATION :   Patient's identity confirmed via 2 patient identifiers (full name and ) at start of session       24 0910   PT Last Visit   PT Visit Date 24   Note Type   Note type Evaluation  (+ Treatment)   Pain Assessment   Pain Assessment Tool FLACC  (Pt unable to verbalize/communicate having pain)   Pain Location/Orientation Orientation: Right;Location: Leg   Effect of Pain on Daily Activities impacts quality of gait   Pain Rating: FLACC (Rest) - Face 0   Pain Rating: FLACC (Rest) - Legs 0   Pain Rating: FLACC (Rest) - Activity 0   Pain Rating: FLACC (Rest) - Cry 0   Pain Rating: FLACC (Rest) - Consolability 0   Score: FLACC (Rest) 0   Pain Rating: FLACC (Activity) - Face 1   Pain Rating: FLACC (Activity) - Legs 1  (LE in ER, limited weight bearing (per sister, this is abnormal for him))   Pain Rating: FLACC (Activity) - Activity 0   Pain Rating: FLACC (Activity) - Cry 0   Pain Rating: FLACC (Activity) - Consolability 0   Score: FLACC (Activity) 2   Restrictions/Precautions   Weight Bearing Precautions Per Order No   Other Precautions Cognitive;Impulsive;Bed Alarm;Chair Alarm;Telemetry;Fall Risk;Visual impairment   Home Living   Type  of Home Group Home   Home Layout One level   Additional Comments Per sister, Omid ambulates without AD PTA    Reviewed care everywhere and OP neuro notes w/ [facility] over the last few years. Damien ambulates w/ a wide based gait, toe walking, and is mentioned HHA of 2 people back in 2022 for safety.    Prior Function   Level of [place] Independent with functional mobility;Needs assistance with ADLs   Lives With Facility staff   Receives Help From Family   Falls in the last 6 months 1 to 4   General   Family/Caregiver Present Yes  (Sister, and his 3 nieces & nephews)   Cognition   Arousal/Participation Alert   Orientation Level Oriented to person   Following Commands Follows one step commands with increased time or repetition   Comments Omid is pleasant and agreeable to participate in PT evaluation. benefits from repetition of instructions at times and motivation from sister. She reports concerns that he isn't mobilizing as much as his facility due to fear of falling (since his biggest fall in 2021 he fractured his femur) and overall becoming more sedentary   RLE Assessment   RLE Assessment WFL  (reports baseline weakness since R femur fx in 2021)   Strength RLE   RLE Overall Strength 3+/5   LLE Assessment   LLE Assessment WFL   Strength LLE   LLE Overall Strength 4-/5   Vision-Basic Assessment   Current Vision Wears glasses all the time   Visual History Glaucoma   Bed Mobility   Supine to Sit 5  Supervision   Additional items Assist x 1;Verbal cues   Transfers   Sit to Stand 5  Supervision   Additional items Assist x 1   Stand to Sit 5  Supervision   Additional items Assist x 1   Ambulation/Elevation   Gait pattern Decreased foot clearance;Decreased R stance;Wide CATY;Short stride  (toe walker (sister reports this is baseline); but RLE in ER, decreased stance)   Gait Assistance 4  Minimal assist   Additional items Assist x 1   Assistive Device Other (Comment);Rolling walker  (HHA)   Distance 50 ft  (25 ft w/ no AD,  25 ft w/ RW)   Ambulation/Elevation Additional Comments Trialed RW given he appears to be putting less weight on RLE. Omid puts the RW too far anterior to his CATY, causing him to be forward flexed. He also has increased difficulty navigating with the RW and running into obstacles, making it a greater fall risk at this trial. May benefit from continued practice w/ RW to see if this would help   Balance   Static Sitting Fair +   Static Standing Fair -   Ambulatory Poor +   Activity Tolerance   Activity Tolerance Patient limited by fatigue   Medical Staff Made Aware Spoke w/ Dr. Connors   Nurse Made Aware Spoke w/ RN Mounika   Assessment   Problem List Decreased strength;Decreased endurance;Impaired balance;Decreased mobility;Decreased cognition;Impaired judgement;Decreased safety awareness;Pain   Assessment Damien May is a 44 y.o. Male who presents to [facility] on 8/23/2024 w/ c/o dizziness and fall and diagnosis of ambulatory dysfunction. He was initially on stroke pathway upon admit, Dc'd from stroke pathway after MRI was negative. Orders for PT eval and treat received. Pt presents w/ comorbidities of ADHD, depression, dysphagia, intermittent explosive disorder, intellectual disability, hx of R femur fracture (2021). At baseline, pt mobilizes supervision w/ no device, and reports + falls in the last 6 months. Upon evaluation, pt presents w/ the following deficits: weakness, impaired balance, impaired vision, decreased endurance, and pain limiting functional mobility. Upon eval, pt requires supervision for bed mobility, supervision for transfers, and min A x 1 for gait. Based on this PT evaluation today, patient's discharge recommendation is for Level III - Low Rehab Resource Intensity - return to facility w/ skilled PT services, pending level of A his group home can provide. During this admission, pt would benefit from continued skilled inpatient PT in the acute care setting in order to address the abovementioned  deficits to maximize function and mobility before DC from acute care.   Goals   Patient Goals to sit up in the chair and read a book   STG Expiration Date 09/03/24   Short Term Goal #1 Patient will: Perform all bed mobility tasks modified independent to improve pt's independence w/ repositioning for decrease risk of skin breakdown, Perform all transfers modified independent consistently from various height surfaces in order to improve I w/ engagement w/ real-world environments/situations, and Ambulate at least 100 ft. +/- LRAD w/ supervision w/o LOB to facilitate return and engagement w/ previous living environment   PT Treatment Day 0   Plan   Treatment/Interventions Functional transfer training;LE strengthening/ROM;Therapeutic exercise;Endurance training;Cognitive reorientation;Patient/family training;Equipment eval/education;Bed mobility;Gait training   PT Frequency 3-5x/wk   Discharge Recommendation   Rehab Resource Intensity Level, PT III (Minimum Resource Intensity)   Equipment Recommended Walker  (may benefit from continued trials w/ RW vs HHA (?))   AM-PAC Basic Mobility Inpatient   Turning in Flat Bed Without Bedrails 3   Lying on Back to Sitting on Edge of Flat Bed Without Bedrails 3   Moving Bed to Chair 3   Standing Up From Chair Using Arms 3   Walk in Room 3   Climb 3-5 Stairs With Railing 2   Basic Mobility Inpatient Raw Score 17   Basic Mobility Standardized Score 39.67   [facility] Highest Level Of Mobility   -HLM Goal 5: Stand one or more mins   -HLM Achieved 7: Walk 25 feet or more   Additional Treatment Session   Start Time 0900   End Time 0910   Treatment Assessment Omid is agreeable to participate in PT intervention. He is seated at the EOB while Dr. Connors examines his RLE. He is able to perform STS w/ supervision w/ VC and TC for task initiation. He is able to ambulate 10 ft around foot of bed w/ min A x 1 and HHA, with occasional furniture reaching. seated OOB in recliner chair at end  of session   Equipment Use no AD, HHA   Additional Treatment Day 1   End of Consult   Patient Position at End of Consult Bedside chair;Bed/Chair alarm activated;All needs within reach         The patient's AM-PAC Basic Mobility Inpatient Short Form Raw Score is 17, Standardized Score is 39.67. A standardized score greater than 38.32 (raw score of 16) suggests the patient may benefit from discharge to home which may not coincide with above PT recommendations. However please refer to therapist recommendation for discharge planning given other factors that may influence destination.      Pt would benefit from skilled inpatient PT during this admission in order to facilitate progress towards goals to maximize functional independence    Pamela Tay PT, DPT

## 2024-08-24 NOTE — DISCHARGE SUMMARY
Discharge Summary - Damien May 44 y.o. male MRN: [id number]    Unit/Bed#: -01 Encounter: [id number]    Admission Date:   Admission Orders (From admission, onward)       Ordered        08/23/24 1301  Place in Observation  Once                            Admitting Diagnosis: TIA (transient ischemic attack) [G45.9]  Chest pain [R07.9]  Right sided weakness [R53.1]    HPI: 44 y.o. male with a PMH of intellectual disability, psychiatric disorder who presents with dizziness, inability to ambulate, chest pain.  Patient is not able to give history.  Patient's father bedside, stated patient would not be able to give meaningful history.  History obtained from chart review and patient's father.     Patient was at [facility], caregiver noted he was falling backward, had difficulty with ambulation, complained of chest pain and throat pain, weakness to right arm and right leg as per staff member.    Procedures Performed:   Orders Placed This Encounter   Procedures    ED ECG Documentation Only       Summary of Hospital Course: Patient was admitted under stroke pathway.  CT/CTA done results as below.  Neurology consulted.  MRI done, did not show any stroke.  PT and OT were consulted.  Patient had chest pain yesterday with ambulation.  Troponins normal, overnight telemetry normal.  Patient sister complained of right leg abnormality when walking with PT. imaging studies done for right hip, right knee, right ankle which did not show any osseous abnormality.  PT recommended home therapy, he will be discharged with home therapy.  Case management assisted with home therapy    Follow-up needed with neurology, neurovascular after discharge      Significant Findings, Care, Treatment and Services Provided:     XR ankle 3+ vw right    Result Date: 8/24/2024  Impression: No acute osseous abnormality. Workstation performed: [id number]     XR knee 1 or 2 vw right    Result Date: 8/24/2024  Impression: No acute osseous  abnormality. Workstation performed: [id number]     XR hip/pelv 2-3 vws right if performed    Result Date: 8/24/2024  Impression: No acute osseous abnormality. Workstation performed: [id number]     MRI brain wo contrast    Result Date: 8/23/2024  Impression: No acute infarct, mass effect or midline shift. Workstation performed: [id number]     XR chest 1 view portable    Result Date: 8/23/2024  Impression: No acute cardiopulmonary disease. Workstation performed: [id number]     CT stroke alert brain    Result Date: 8/23/2024  Impression: No acute intracranial abnormality. Basal ganglia mineralization, bilaterally, and extensive white matter hypodensities involving both cerebral hemispheres, greater than expected for the patient's age. These findings may represent Fahr's disease, a mitochondrial/dysmyelinating disorder, and endocrinopathy resulting in a calcium phosphate metabolism disorder, or alternatively the sequela of prior insult. Correlation with the patient's past medical history is recommended. Findings were directly discussed with Dr. Merrill Hutchinson at approximately 12:25 p.m. on 8/23/2024. Workstation performed: [id number]     CTA stroke alert (head/neck)    Result Date: 8/23/2024  Impression: No large vessel occlusion, high-grade stenosis identified on CT angiogram of the head. 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 No hemodynamically significant stenosis or dissection identified on CT angiogram of the neck. Findings were directly discussed with Dr. Silverio Hutchinson at 12:38 p.m. on 8/23/2024. Workstation performed: [id number]       XR chest 1 view portable    Result Date: 8/23/2024  Impression No acute cardiopulmonary disease. Workstation performed: [id number]         Complications: None    Discharge Diagnosis: Ambulatory dysfunction    Medical Problems       Resolved Problems  Date Reviewed: 5/27/2022   None         Condition  at Discharge: stable         Discharge instructions/Information to patient and family:   See after visit summary for information provided to patient and family.      Provisions for Follow-Up Care:  See after visit summary for information related to follow-up care and any pertinent home health orders.      PCP: Kandy Villarreal MD    Disposition:  Previous group home with home therapy    Planned Readmission: No      Discharge Statement   I spent 33 minutes discharging the patient. This time was spent on the day of discharge. I had direct contact with the patient on the day of discharge. Additional documentation is required if more than 30 minutes were spent on discharge.     Discharge Medications:  See after visit summary for reconciled discharge medications provided to patient and family.

## 2024-08-24 NOTE — CASE MANAGEMENT
Case Management Assessment & Discharge Planning Note    Patient name Damien May  Location /-01 MRN [id number]  : 1980 Date 2024       Current Admission Date: 2024  Current Admission Diagnosis:Stroke-like symptoms   Patient Active Problem List    Diagnosis Date Noted Date Diagnosed    Stroke-like symptoms 2024     Chest pain 2024     Thrombocytopenia (HCC) 2022     Hypotension 2022     Weight loss 2022     Acute sore throat 2021     Sore throat 2021     Bipolar disorder (HCC) 2021     Ambulatory dysfunction 2021     Dysphagia 2021     Falls 2021     Glaucoma 2021     Closed nondisplaced intertrochanteric fracture of right femur with routine healing 10/31/2021       LOS (days): 0  Geometric Mean LOS (GMLOS) (days):   Days to GMLOS:     OBJECTIVE:              Current admission status: Observation       Preferred Pharmacy:   [place] [address]  Phone: [phone] Fax: [phone]    Primary Care Provider: Kandy Villarreal MD    Primary Insurance: MEDICARE  Secondary Insurance: BLUE CROSS    ASSESSMENT:  Active Health Care Proxies       John May [facility] Care Representative - Father   Primary Phone: [phone] (Mobile)                 Advance Directives  Does patient have a Health Care POA?: No  Was patient offered paperwork?: Yes (father declined)  Does patient have Advance Directives?: No  Was patient offered paperwork?: Yes (father declined offer)    Obs Notice Signed: 24    Readmission Root Cause  30 Day Readmission: No    Patient Information  Admitted from:: Home  Mental Status: Other (Comment) (intellual disability)  During Assessment patient was accompanied by: Parent  Assessment information provided by:: Parent  Support Systems: Parent, Other (Comment) (group home staff)  What city do you live in?: [place]  Home entry  access options. Select all that apply.: Ramp  Type of Current Residence: [facility]  Living Arrangements: Other (Comment) (Group home)  Is patient a ?: No    Activities of Daily Living Prior to Admission  Functional Status: Independent  Completes ADLs independently?: Yes  Ambulates independently?: Yes  Does patient use assisted devices?: No  Does patient currently own DME?: No  Does patient have a history of Outpatient Therapy (PT/OT)?: No  Does the patient have a history of Short-Term Rehab?: Yes (SNF in [place])  Does patient have a history of HHC?: No  Does patient currently have HHC?: No         Patient Information Continued  Income Source: SSI/SSD  Does patient have prescription coverage?: Yes  Does patient have a history of substance abuse?: No  Does patient have a history of Mental Health Diagnosis?: Yes  Is patient receiving treatment for mental health?: Yes  Has patient received inpatient treatment related to mental health in the last 2 years?: No         Means of Transportation  Means of Transport to [facility]:: Other (Comment) (staff at group home)      Social Determinants of Health (SDOH)      Flowsheet Row Most Recent Value   Housing Stability    In the last 12 months, was there a time when you were not able to pay the mortgage or rent on time? N   In the past 12 months, how many times have you moved where you were living? 0   At any time in the past 12 months, were you homeless or living in a shelter (including now)? N   Transportation Needs    In the past 12 months, has lack of transportation kept you from medical appointments or from getting medications? no   In the past 12 months, has lack of transportation kept you from meetings, work, or from getting things needed for daily living? No   Food Insecurity    Within the past 12 months, you worried that your food would run out before you got the money to buy more. Never true   Within the past 12 months, the food you bought just didn't last and you didn't  have money to get more. Never true   Utilities    In the past 12 months has the electric, gas, oil, or water company threatened to shut off services in your home? No            DISCHARGE DETAILS:    Discharge planning discussed with:: patient and his father, John        CM contacted family/caregiver?: Yes  Were Treatment Team discharge recommendations reviewed with patient/caregiver?: Yes     Were patient/caregiver advised of the risks associated with not following Treatment Team discharge recommendations?: Yes         Requested Home Health Care         Is the patient interested in HHC at discharge?: Yes  Home Health Discipline requested:: Occupational Therapy, Physical Therapy  HHA External Referral Reason (only applicable if external HHA name selected): Patient has established relationship with provider  Home Health Follow-Up Provider:: PCP  Home Health Services Needed:: Evaluate Functional Status and Safety, Gait/ADL Training  Homebound Criteria Met:: Requires the Assistance of Another Person for Safe Ambulation or to Leave the Home  Supporting Clincal Findings:: Cognitive Deficit Requiring the Assistance of Others          Received consult patient for discharge and needs Home PT/OT.  Met with patient with his father, John present to review the role of CM and discuss Pt/OT's recommendation for Home PT/OT.  Patient resides in a group home wit 3 other men and a house supervisor who assist with care. He resides in a ranch home with a ramp to enter. He is independent of ADL''s and goes to a Day Program. He uses no DME. His father is agreeable to HHC, referral to VNA's made via [facility], wait availability and preference.  Either his father of staff from the group home will transport

## 2024-08-24 NOTE — CASE MANAGEMENT
Case Management Discharge Planning Note    Patient name Damien May  Location /-01 MRN [id number]  : 1980 Date 2024       Current Admission Date: 2024  Current Admission Diagnosis:Stroke-like symptoms   Patient Active Problem List    Diagnosis Date Noted Date Diagnosed    Stroke-like symptoms 2024     Chest pain 2024     Thrombocytopenia (HCC) 2022     Hypotension 2022     Weight loss 2022     Acute sore throat 2021     Sore throat 2021     Bipolar disorder (HCC) 2021     Ambulatory dysfunction 2021     Dysphagia 2021     Falls 2021     Glaucoma 2021     Closed nondisplaced intertrochanteric fracture of right femur with routine healing 10/31/2021       LOS (days): 0  Geometric Mean LOS (GMLOS) (days):   Days to GMLOS:     OBJECTIVE:            Current admission status: Observation   Preferred Pharmacy:   [place] [address]  Phone: [phone] Fax: [phone]    Primary Care Provider: Kandy Villarreal MD    Primary Insurance: MEDICARE  Secondary Insurance: BLUE CROSS    DISCHARGE DETAILS:    Discharge planning discussed with:: patient and his father, John MONTES contacted family/caregiver?: Yes  Were Treatment Team discharge recommendations reviewed with patient/caregiver?: Yes     Were patient/caregiver advised of the risks associated with not following Treatment Team discharge recommendations?: Yes         Requested Home Health Care         Is the patient interested in HHC at discharge?: Yes  Home Health Discipline requested:: Occupational Therapy, Physical Therapy  HHA External Referral Reason (only applicable if external HHA name selected): Patient has established relationship with provider  Home Health Follow-Up Provider:: PCP  Home Health Services Needed:: Evaluate Functional Status and Safety, Gait/ADL Training  Homebound Criteria  Met:: Requires the Assistance of Another Person for Safe Ambulation or to Leave the Home  Supporting Clincal Findings:: Cognitive Deficit Requiring the Assistance of Others     List of accepting [facility] given to patient's father, [facility] was only accepting. He is agreeable to [facility], reserved on [facility] and their contact information was added to patient's AVS.

## 2024-08-24 NOTE — PLAN OF CARE
Problem: PHYSICAL THERAPY ADULT  Goal: Performs mobility at highest level of function for planned discharge setting.  See evaluation for individualized goals.  Description: Treatment/Interventions: Functional transfer training, LE strengthening/ROM, Therapeutic exercise, Endurance training, Cognitive reorientation, Patient/family training, Equipment eval/education, Bed mobility, Gait training  Equipment Recommended: Walker (may benefit from continued trials w/ RW vs HHA (?))       See flowsheet documentation for full assessment, interventions and recommendations.  8/24/2024 1135 by Pamela Tay, PT  Note:    Problem List: Decreased strength, Decreased endurance, Impaired balance, Decreased mobility, Decreased cognition, Impaired judgement, Decreased safety awareness, Pain  Assessment: Damien May is a 44 y.o. Male who presents to [facility] on 8/23/2024 w/ c/o dizziness and fall and diagnosis of ambulatory dysfunction. He was initially on stroke pathway upon admit, Dc'd from stroke pathway after MRI was negative. Orders for PT eval and treat received. Pt presents w/ comorbidities of ADHD, depression, dysphagia, intermittent explosive disorder, intellectual disability, hx of R femur fracture (2021). At baseline, pt mobilizes supervision w/ no device, and reports + falls in the last 6 months. Upon evaluation, pt presents w/ the following deficits: weakness, impaired balance, impaired vision, decreased endurance, and pain limiting functional mobility. Upon eval, pt requires supervision for bed mobility, supervision for transfers, and min A x 1 for gait. Based on this PT evaluation today, patient's discharge recommendation is for Level III - Low Rehab Resource Intensity - return to facility w/ skilled PT services, pending level of A his group home can provide. During this admission, pt would benefit from continued skilled inpatient PT in the acute care setting in order to address the abovementioned deficits to  maximize function and mobility before DC from acute care.        Rehab Resource Intensity Level, PT: III (Minimum Resource Intensity)    See flowsheet documentation for full assessment.

## 2024-08-25 LAB — MRSA NOSE QL CULT: NORMAL

## 2024-09-05 PROBLEM — I67.1 INTRACRANIAL ANEURYSM: Status: ACTIVE | Noted: 2024-09-05

## 2024-09-05 NOTE — ASSESSMENT & PLAN NOTE
New evaluation of bilateral ICA terminus/Ach infundibula vs aneurysms  Noted incidentally on ED work up of imbalance, dizziness, CP 8/2024. No stroke on MRI  No family history of aneurysm or sudden death.  Non-smoker.  BP controlled.  Lives in a group home due to history of MR, mood disorders and is minimally verbal    Imaging:  CTA head/neck 8/23/24: No large vessel occlusion, high-grade stenosis identified on CT angiogram of the head. 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 No hemodynamically significant stenosis or dissection identified on CT angiogram of the neck.    Plan:  Reviewed imaging with patient, caregiver, and father via telephone.  Small bilateral ICA terminus/anterior choroidal artery aneurysms versus infundibula.  No neurosurgical invention recommended.  Discussed natural history of aneurysms and risk of rupture, as well as an infundibulum which is an anatomic variant with no risk of rupture.  Discussed genetic component to aneurysms as well as modifiable risk factors such as smoking and HTN.  If this is a true aneurysm given current size and location, risk of rupture <1%/year per UCAS and <1%/5yrs per ISIUA.  Reviewed red flag s/s.  Follow up in 1 year with CTA head to see AP.  Per caretaker, he would not be able to remain still for an MRA  Call sooner with any questions or concerns.

## 2024-09-09 ENCOUNTER — TELEPHONE (OUTPATIENT)
Dept: NEUROSURGERY | Facility: CLINIC | Age: 44
End: 2024-09-09

## 2024-09-09 ENCOUNTER — OFFICE VISIT (OUTPATIENT)
Dept: NEUROSURGERY | Facility: CLINIC | Age: 44
End: 2024-09-09
Payer: MEDICARE

## 2024-09-09 VITALS
DIASTOLIC BLOOD PRESSURE: 76 MMHG | RESPIRATION RATE: 16 BRPM | TEMPERATURE: 98.6 F | OXYGEN SATURATION: 96 % | HEART RATE: 83 BPM | SYSTOLIC BLOOD PRESSURE: 104 MMHG | BODY MASS INDEX: 18.01 KG/M2 | WEIGHT: 115 LBS

## 2024-09-09 DIAGNOSIS — R29.90 STROKE-LIKE SYMPTOMS: ICD-10-CM

## 2024-09-09 DIAGNOSIS — I67.1 INTRACRANIAL ANEURYSM: Primary | ICD-10-CM

## 2024-09-09 DIAGNOSIS — R93.89 ABNORMAL COMPUTED TOMOGRAPHY ANGIOGRAPHY (CTA): ICD-10-CM

## 2024-09-09 PROCEDURE — 99203 OFFICE O/P NEW LOW 30 MIN: CPT | Performed by: PHYSICIAN ASSISTANT

## 2024-09-09 NOTE — TELEPHONE ENCOUNTER
1 year follow up after cta head on 9/9/25 at b 830am. Follow up with an AP on 9/12/25 at [facility] at 11:15 am.     Megan was with patient and scheduled appointments at check out with patient.

## 2024-09-09 NOTE — PROGRESS NOTES
Neurosurgery Office Note  Damien May 44 y.o. male MRN: [id number]      Assessment & Plan     Intracranial aneurysm  New evaluation of bilateral ICA terminus/Ach infundibula vs aneurysms  Noted incidentally on ED work up of imbalance, dizziness, CP 8/2024. No stroke on MRI  No family history of aneurysm or sudden death.  Non-smoker.  BP controlled.  Lives in a group home due to history of MR, mood disorders and is minimally verbal    Imaging:  CTA head/neck 8/23/24: No large vessel occlusion, high-grade stenosis identified on CT angiogram of the head. 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 No hemodynamically significant stenosis or dissection identified on CT angiogram of the neck.    Plan:  Reviewed imaging with patient, caregiver, and father via telephone.  Small bilateral ICA terminus/anterior choroidal artery aneurysms versus infundibula.  No neurosurgical invention recommended.  Discussed natural history of aneurysms and risk of rupture, as well as an infundibulum which is an anatomic variant with no risk of rupture.  Discussed genetic component to aneurysms as well as modifiable risk factors such as smoking and HTN.  If this is a true aneurysm given current size and location, risk of rupture <1%/year per UCAS and <1%/5yrs per ISIUA.  Reviewed red flag s/s.  Follow up in 1 year with CTA head to see AP.  Per caretaker, he would not be able to remain still for an MRA  Call sooner with any questions or concerns.        Diagnoses and all orders for this visit:    Intracranial aneurysm  -     BUN; Future  -     Creatinine, serum; Future  -     CTA head w wo contrast; Future    Stroke-like symptoms  -     Ambulatory Referral to Neurosurgery    Abnormal computed tomography angiography (CTA)  -     Ambulatory Referral to Neurosurgery    Other orders  -     Multiple Vitamin (TAB-A-JUAN PO); Take by mouth daily  -     Loperamide  HCl (IMODIUM PO); Take 2 mg by mouth as needed          I have spent a total time of 35 minutes on 09/09/24 in caring for this patient including Diagnostic results, Risks and benefits of tx options, Instructions for management, Patient and family education, Risk factor reductions, Impressions, Counseling / Coordination of care, Documenting in the medical record, Reviewing / ordering tests, medicine, procedures  , and Obtaining or reviewing history  .      CHIEF COMPLAINT    Chief Complaint   Patient presents with    Consult     Abnormal brain imaging       HISTORY    History of Present Illness     44 y.o. year old male     44 year old gentleman seen with his caretaker for evaluation of bilateral ICA terminus aneurysms versus infundibulum.  These were incidentally noted on ED evaluation of dizziness, imbalance, chest pain August 2024.  MRI was negative for stroke.  He is minimally verbal with history of MR and mood disorders.  Resides in a group home. Not aware of any family history of aneurysm or sudden death, confirm with patient's father via phone.  Non-smoker.  BP controlled.  Difficulty assessing ROS.        See Discussion    REVIEW OF SYSTEMS    Review of Systems   Unable to perform ROS: Patient nonverbal   Musculoskeletal:  Positive for gait problem.       ROS obtained by MA. Reviewed. See HPI.     Meds/Allergies     Current Outpatient Medications   Medication Sig Dispense Refill    brimonidine-timolol (COMBIGAN) 0.2-0.5 % Administer 1 drop to both eyes every 12 (twelve) hours      brinzolamide (AZOPT) 1 % ophthalmic suspension Administer 1 drop to both eyes 2 (two) times a day      carbidopa-levodopa (SINEMET)  mg per tablet Take 1.5 tablets by mouth 3 (three) times a day  0    CITALOPRAM HYDROBROMIDE PO Take 20 mg by mouth daily      desmopressin (DDAVP) 0.1 mg tablet Take 0.1 mg by mouth daily      diphenhydrAMINE (BENADRYL) 25 mg tablet Take 25 mg by mouth as needed for itching      fluticasone  (FLONASE) 50 mcg/act nasal spray 2 sprays into each nostril as needed      latanoprost (XALATAN) 0.005 % ophthalmic solution Administer 1 drop to both eyes daily at bedtime      Loperamide HCl (IMODIUM PO) Take 2 mg by mouth as needed      Multiple Vitamin (TAB-A-JUAN PO) Take by mouth daily      omeprazole (PriLOSEC) 20 mg delayed release capsule Take 20 mg by mouth daily      polyethylene glycol (MIRALAX) 17 g packet Take 17 g by mouth daily      acetaminophen (TYLENOL) 500 mg tablet Take 500 mg by mouth every 6 (six) hours as needed (Patient not taking: Reported on 9/9/2024)       No current facility-administered medications for this visit.       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    Social History     Tobacco Use    Smoking status: Never    Smokeless tobacco: Never   Vaping Use    Vaping status: Never Used   Substance Use Topics    Alcohol use: No    Drug use: Not Currently       No family history on file.      Above history personally reviewed.       EXAM    Vitals:Blood pressure 104/76, pulse 83, temperature 98.6 °F (37 °C), temperature source Temporal, resp. rate 16, weight 52.2 kg (115 lb), SpO2 96%.,Body mass index is 18.01 kg/m².     Physical Exam  Vitals reviewed.   Constitutional:       General: He is awake.      Appearance: Normal appearance.   HENT:      Head: Normocephalic.   Eyes:      Conjunctiva/sclera: Conjunctivae normal.   Cardiovascular:      Rate and Rhythm: Normal rate.   Pulmonary:       Effort: Pulmonary effort is normal.   Skin:     General: Skin is warm and dry.   Neurological:      Mental Status: He is alert.      Motor: Motor strength is normal.     Deep Tendon Reflexes:      Reflex Scores:       Bicep reflexes are 1+ on the right side and 1+ on the left side.       Brachioradialis reflexes are 1+ on the right side and 1+ on the left side.       Patellar reflexes are 1+ on the right side and 1+ on the left side.  Psychiatric:         Attention and Perception: He is inattentive.         Mood and Affect: Mood normal.         Cognition and Memory: Cognition is impaired.      Comments: Able to say few words         Neurologic Exam     Mental Status   Oriented to person.   Follows 1 step commands.   Attention: decreased. Concentration: decreased.   Speech: (Speaks few words)  Level of consciousness: alert  Abnormal comprehension.     Cranial Nerves     CN VII   Facial expression full, symmetric.     CN VIII   Hearing: intact  Unable to cooperative to check pupils or EOM  Tongue midline   Symmetric face and smile, able to raise eyebrows      Motor Exam     Strength   Strength 5/5 throughout.     Gait, Coordination, and Reflexes     Gait  Gait: spastic    Tremor   Resting tremor: absent  Intention tremor: absent    Reflexes   Right brachioradialis: 1+  Left brachioradialis: 1+  Right biceps: 1+  Left biceps: 1+  Right patellar: 1+  Left patellar: 1+  Right Clayton: absent  Left Clayton: absent        MEDICAL DECISION MAKING    Imaging Studies:     XR ankle 3+ vw right    Result Date: 8/24/2024  Narrative: XR ANKLE 3+ VW RIGHT INDICATION: pain, ?fall. COMPARISON: None FINDINGS: No acute fracture or dislocation. Plantar and retrocalcaneal heel spurs. No lytic or blastic osseous lesion. Unremarkable soft tissues.     Impression: No acute osseous abnormality. Workstation performed: [id number]     XR knee 1 or 2 vw right    Result Date: 8/24/2024  Narrative: XR KNEE 1 OR 2 VW RIGHT INDICATION: pain, ?fall.  COMPARISON: None FINDINGS: No acute fracture or dislocation. No joint effusion. Enthesopathy at the superior aspect of the patella. No lytic or blastic osseous lesion. Unremarkable soft tissues.     Impression: No acute osseous abnormality. Workstation performed: [id number]     XR hip/pelv 2-3 vws right if performed    Result Date: 8/24/2024  Narrative: XR HIP/PELV 2-3 VWS RIGHT W PELVIS IF PERFORMED INDICATION: pain. COMPARISON: CT abdomen pelvis 6/22/2007 FINDINGS: No acute fracture or dislocation. Mild bilateral hip osteoarthritis. Partially visualized right hip ORIF hardware. No lytic or blastic osseous lesion. Stool ball in the rectum. There is also stool in the perineal region. Unremarkable visualized lumbar spine.     Impression: No acute osseous abnormality. Workstation performed: [id number]     MRI brain wo contrast    Result Date: 8/23/2024  Narrative: MRI BRAIN WITHOUT CONTRAST INDICATION: stroke. COMPARISON:   CTA head and neck and CT head 8/23/2024. TECHNIQUE:  Multiplanar, multisequence imaging of the brain was performed. IMAGE QUALITY: Motion-degraded. FINDINGS: BRAIN PARENCHYMA: No acute infarct, mass effect or midline shift. Moderate chronic ischemic changes of the white matter. Redemonstrated susceptibility artifact corresponding to mineralization on CT involving the bilateral gangliocapsular regions, nonspecific. VENTRICLES:  Normal for the patient's age. SELLA AND PITUITARY GLAND:  Normal. ORBITS: No acute abnormality. PARANASAL SINUSES: Essentially clear. VASCULATURE: Please refer to concurrent CTA. CALVARIUM AND SKULL BASE: No acute abnormality. EXTRACRANIAL SOFT TISSUES: No acute abnormality.     Impression: No acute infarct, mass effect or midline shift. Workstation performed: [id number]     XR chest 1 view portable    Result Date: 8/23/2024  Narrative: XR CHEST PORTABLE INDICATION: chest pain. COMPARISON: Chest radiograph 5/26/2022. FINDINGS: Clear lungs. No pneumothorax or pleural effusion.  Normal cardiomediastinal silhouette. Bones are unremarkable for age. Normal upper abdomen.     Impression: No acute cardiopulmonary disease. Workstation performed: [id number]     CT stroke alert brain    Result Date: 8/23/2024  Narrative: CT BRAIN - STROKE ALERT PROTOCOL INDICATION:   Stroke Alert. COMPARISON: Head CT from 5/29/2018 TECHNIQUE:  CT examination of the brain was performed.  In addition to axial images, coronal reformatted images were created and submitted for interpretation. Radiation dose length product (DLP) for this visit: .  This examination, like all CT scans performed in the [facility], was performed utilizing techniques to minimize radiation dose exposure, including the use of iterative reconstruction  and automated exposure control. IMAGE QUALITY:  Diagnostic. FINDINGS: PARENCHYMA:  No intracranial mass, mass effect or midline shift. No CT signs of acute infarction.  No acute parenchymal hemorrhage. There are bilateral basal ganglia calcifications/mineralization. There are extensive white matter low-attenuation areas again demonstrated involving both cerebral hemispheres, stable. Normal intracranial vasculature. VENTRICLES AND EXTRA-AXIAL SPACES:  Normal for the patient's age. VISUALIZED ORBITS: Normal visualized orbits. PARANASAL SINUSES: Normal visualized paranasal sinuses. CALVARIUM AND EXTRACRANIAL SOFT TISSUES:   Normal.     Impression: No acute intracranial abnormality. Basal ganglia mineralization, bilaterally, and extensive white matter hypodensities involving both cerebral hemispheres, greater than expected for the patient's age. These findings may represent Fahr's disease, a mitochondrial/dysmyelinating disorder, and endocrinopathy resulting in a calcium phosphate metabolism disorder, or alternatively the sequela of prior insult. Correlation with the patient's past medical history is recommended. Findings were directly discussed with Dr. Merrill Hutchinson at  approximately 12:25 p.m. on 8/23/2024. Workstation performed: [id number]     CTA stroke alert (head/neck)    Result Date: 8/23/2024  Narrative: CTA NECK AND BRAIN WITH CONTRAST INDICATION: Stroke Alert COMPARISON:   Head CT from 5/29/2018 TECHNIQUE:  Post contrast imaging was performed after administration of iodinated contrast through the neck and brain. Post contrast axial 0.625 mm images timed to opacify the arterial system.  3D rendering was performed on an independent workstation.   MIP reconstructions performed. Radiation dose length product (DLP) for this visit: .  This examination, like all CT scans performed in the [facility], was performed utilizing techniques to minimize radiation dose exposure, including the use of iterative reconstruction  and automated exposure control. IV Contrast: Administered IMAGE QUALITY:   Diagnostic FINDINGS: CTA NECK ARCH AND GREAT VESSELS: The visualized aortic arch appears grossly normal. The configuration the great vessel origins is typical. VERTEBRAL ARTERIES: Patent extracranial segments. The vertebral arteries are codominant. No vertebral artery stenosis or dissection is identified. RIGHT CAROTID: No atherosclerotic plaque is identified. No stenosis.    No dissection. LEFT CAROTID: No atherosclerotic plaque is identified. No stenosis.    No dissection. NASCET criteria was used to determine the degree of internal carotid artery diameter stenosis. CTA BRAIN: INTERNAL CAROTID ARTERIES: No stenosis or occlusion is identified. There is a small infundibulum/aneurysm arising near the terminal left internal carotid artery posteriorly, likely at the origin of the left anterior choroidal artery measuring approximately 1 to 2 mm in maximal dimension, seen on series 3 image 205. Similar tiny infundibulum or aneurysm noted arising from the posterior aspect of the right internal carotid artery near the terminus, measuring approximately 1 mm in maximal dimensions and  series 3 image 207, also likely at the origin of the right anterior cranial artery. ANTERIOR CEREBRAL ARTERY CIRCULATION:  No stenosis or occlusion. MIDDLE CEREBRAL ARTERY CIRCULATION:  No stenosis or occlusion. DISTAL VERTEBRAL ARTERIES:  No stenosis or occlusion. BASILAR ARTERY:  No stenosis or occlusion. The bilateral superior cerebellar arteries are patent. POSTERIOR CEREBRAL ARTERIES: No stenosis or occlusion. Left larger than right posterior communicating arteries present. VENOUS STRUCTURES:  Normal. NON VASCULAR ANATOMY BONY STRUCTURES: There is multilevel cervical spondylosis. No fractures are identified. SOFT TISSUES OF THE NECK:  Normal. THORACIC INLET: There is pleural-parenchymal scarring involving the lung apices.     Impression: No large vessel occlusion, high-grade stenosis identified on CT angiogram of the head. 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 No hemodynamically significant stenosis or dissection identified on CT angiogram of the neck. Findings were directly discussed with Dr. Silverio Hutchinson at 12:38 p.m. on 8/23/2024. Workstation performed: [id number]       I have personally reviewed pertinent reports.   and I have personally reviewed pertinent films in PACS   Intact

## 2024-12-19 ENCOUNTER — OFFICE VISIT (OUTPATIENT)
Dept: PODIATRY | Facility: CLINIC | Age: 44
End: 2024-12-19
Payer: MEDICARE

## 2024-12-19 VITALS — WEIGHT: 116 LBS | BODY MASS INDEX: 18.21 KG/M2 | HEIGHT: 67 IN

## 2024-12-19 DIAGNOSIS — L60.3 NAIL DYSTROPHY: Primary | ICD-10-CM

## 2024-12-19 PROCEDURE — 99202 OFFICE O/P NEW SF 15 MIN: CPT | Performed by: PODIATRIST

## 2024-12-19 NOTE — PROGRESS NOTES
"Name: Damien May      : 1980      MRN: [id number]  Encounter Provider: Paul Novoa DPM  Encounter Date: 2024   Encounter department: [facility] PODIATRY [place]    Treatment consisted of nail trimming.  Pedal pulses are within normal limits.  :  Assessment & Plan  Nail dystrophy             History of Present Illness   HPI  Damien May is a 44 y.o. male who presents for toenail care.  Patient is here with an aide as he lives in a group home.  Chief complaint involves elongated toenails that he cannot trim.      Review of Systems   Psychiatric/Behavioral:          Psychiatric disorder; bipolar              Objective   Ht 5' 7\" (1.702 m)   Wt 52.6 kg (116 lb)   BMI 18.17 kg/m²      Physical Exam  Cardiovascular:      Pulses: Normal pulses.   Musculoskeletal:         General: Normal range of motion.   Skin:     Comments: All toenails are elongated.               "

## 2025-03-06 ENCOUNTER — HOSPITAL ENCOUNTER (EMERGENCY)
Facility: HOSPITAL | Age: 45
Discharge: HOME | End: 2025-03-06
Attending: EMERGENCY MEDICINE
Payer: MEDICARE

## 2025-03-06 VITALS
HEART RATE: 53 BPM | WEIGHT: 123.68 LBS | OXYGEN SATURATION: 100 % | RESPIRATION RATE: 18 BRPM | TEMPERATURE: 97.6 F | BODY MASS INDEX: 19.37 KG/M2 | SYSTOLIC BLOOD PRESSURE: 126 MMHG | DIASTOLIC BLOOD PRESSURE: 77 MMHG

## 2025-03-06 DIAGNOSIS — S09.90XA INJURY OF HEAD, INITIAL ENCOUNTER: ICD-10-CM
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
## 2025-03-06 PROCEDURE — 99283 EMERGENCY DEPT VISIT LOW MDM: CPT

## 2025-03-06 PROCEDURE — 99283 EMERGENCY DEPT VISIT LOW MDM: CPT | Performed by: EMERGENCY MEDICINE

## 2025-03-06 NOTE — DISCHARGE INSTRUCTIONS
"Recommend Tylenol and ibuprofen as needed for pain, you may also apply ice to the painful area, return for any worsening symptoms    Patient Education     Head injury in adults   The Basics   Written by the doctors and editors at [facility]   What causes head injuries? -- A head injury can happen when a person hits their head on a hard surface or is hit in the head with something. The most common causes of head injury are falls, sports injuries, and car and bike accidents.  Some head injuries are minor, like a bump on the head. With minor head injuries, the skull protects the brain from damage. Others can be more serious and cause brain injury. A \"concussion\" is the medical term for a mild brain injury. Sometimes, head injuries can be serious or life-threatening.  A more serious head injury can cause:   Broken bone of the skull or face (figure 1)   Brain injury or swelling   Bleeding in or around the brain  This article discusses head injuries in people 18 years and older. Head injuries in children might be managed differently.  What are the symptoms of a head injury? -- Symptoms depend on the type of injury a person has and how severe it is. People with a minor head injury, such as a bump on the head, might not have any symptoms.  Some people pass out or lose consciousness when they get a head injury. If a person does not wake up quickly, or blacks out several minutes or hours after a head injury, they might have bleeding in the brain. The person needs emergency help.  Other symptoms that can happen after a head injury include:   Headache   Nausea or vomiting   Swelling, bleeding, or bruising on the scalp   Dizziness   Confusion or memory problems   Vision problems   Feeling tired or sleepy   Mood or behavior changes, or not acting like yourself   Trouble walking or talking   Seizures - Seizures are waves of abnormal electrical activity in the brain. They can make you pass out, or move or behave strangely.  A head " injury that involves a broken skull or face bone can also cause:   Bruising around the eyes or behind the ear   Blood or clear fluid draining from the nose or ear  Symptoms can start right after a head injury, or a few hours or days later.  Some people have symptoms that last a short time only. Other people have symptoms that cause long-lasting problems.  Will I need tests? -- It depends on your injury and symptoms. Your doctor or nurse will ask about your symptoms and do an exam. They will also ask questions to find out if you are able to think clearly.  If your doctor or nurse thinks that you might have a serious injury, they might order an imaging test of your brain. This might be a CT or MRI scan. These tests create pictures of your skull and brain.  How are head injuries treated? -- Treatment depends on your injury and how serious it is.  Usually, minor head injuries do not need treatment. But your doctor might recommend things like:   Having someone stay with you for 24 hours after your injury - This person should watch for new symptoms or the symptoms listed above. They should also make sure that you can wake up at a normal time after you fall asleep. It is not usually necessary to wake you up during the night.   Taking over-the-counter pain medicines - Acetaminophen (sample brand name: Tylenol) might help relieve a headache.   Rest - It can be important to rest if you have symptoms after a concussion. This means resting your body and avoiding activities that make you feel worse. It can also help to rest your brain. Avoid looking at screens until you are feeling better.   Ice - If you bumped your head, ice can help with pain and swelling. Apply a cold gel pack, bag of ice, or bag of frozen vegetables on the area every 1 to 2 hours, for 15 minutes each time. Put a thin towel between the ice (or other cold object) and your head. Use the ice (or other cold object) for at least 6 hours after your injury.  Severe  head injuries need to be treated in the hospital. In this case, treatment can include:   Medicines - Different medicines can help prevent brain swelling, bleeding in the brain, or seizures.   Surgery - If you have bleeding in or around your brain, or if your brain swells, you might need surgery.  When should I call for help? -- If you had a head injury, there are certain problems that you or your caregiver should watch for.  Someone should call for an ambulance (in the [place] and [place], call 9-1-1) if you:   Cannot be fully woken up   Are acting confused or disoriented   Have a sudden and persistent change in your behavior   Cannot walk normally   Have trouble speaking or slurred speech   Have severe weakness or cannot move an arm, leg, or 1 side of your face   Have a seizure, or jerking of your arms or legs you cannot control  Call the doctor or nurse for advice if you:   Have trouble concentrating, thinking clearly, or remembering things   Have trouble waking from sleep or staying awake   Have nausea or vomiting that is not improving   Have blurry eyesight, double vision, or other problems seeing   Have blood or clear liquid draining from your ears or nose   Feel dizzy or faint   Seem weak or have numbness in an arm, leg, or other body part   Have a stiff neck   Have a headache that is severe, gets worse, feels different, or does not get better with over-the-counter medicines  If any of the above symptoms seem severe, or if you are concerned but cannot reach the doctor or nurse, seek emergency help. These things don't always mean there is a serious problem, but seeing a doctor or nurse is the only way to know for sure.  When can I play sports or do my usual activities again? -- Ask your doctor when you can play sports or do your usual activities again. It depends on your injury and symptoms.  How can head injuries be prevented? -- To help prevent another head injury, you should wear a helmet when you ride a bike or  motorcycle, or when you play sports where you could hit your head. You should also wear a seat belt every time you drive or ride in a car.  All topics are updated as new evidence becomes available and our peer review process is complete.  This topic retrieved from [employer] on: Mar 06, 2024.  Topic 42969 Version 13.0  Release: 32.2.4 - C32.64  © 2024 UpToDate, Inc. and/or its affiliates. All rights reserved.  figure 1: Bones of the skull and face     Graphic 77068 Version 2.0  Consumer Information Use and Disclaimer   Disclaimer: This generalized information is a limited summary of diagnosis, treatment, and/or medication information. It is not meant to be comprehensive and should be used as a tool to help the user understand and/or assess potential diagnostic and treatment options. It does NOT include all information about conditions, treatments, medications, side effects, or risks that may apply to a specific patient. It is not intended to be medical advice or a substitute for the medical advice, diagnosis, or treatment of a health care provider based on the health care provider's examination and assessment of a patient's specific and unique circumstances. Patients must speak with a health care provider for complete information about their health, medical questions, and treatment options, including any risks or benefits regarding use of medications. This information does not endorse any treatments or medications as safe, effective, or approved for treating a specific patient. UpToDate, Inc. and its affiliates disclaim any warranty or liability relating to this information or the use thereof.The use of this information is governed by the Terms of Use, available at https://www.wolterskluwer.com/en/know/clinical-effectiveness-terms. 2024© UpToDate, Inc. and its affiliates and/or licensors. All rights reserved.  Copyright   © 2024 [employer]Date, Inc. and/or its affiliates. All rights reserved.

## 2025-03-06 NOTE — ED PROVIDER NOTES
Time reflects when diagnosis was documented in both MDM as applicable and the Disposition within this note       Time User Action Codes Description Comment    3/6/2025  9:30 AM Check, Arvinedyta Cabello [W19.XXXA] Fall, initial encounter     3/6/2025  9:30 AM Check, Arvin Irineo [S09.90XA] Injury of head, initial encounter           ED Disposition       ED Disposition   Discharge    Condition   Stable    Date/Time   Thu Mar 6, 2025  9:30 AM    Comment   Damien May discharge to home/self care.                   Assessment & Plan       Medical Decision Making  44-year-old male presents for evaluation of mechanical fall with positive head strike.  On exam is resting comfortably in bed in no acute distress.  Airway is patent, he has bilateral breath sounds and intact distal pulses.  Small hematoma with small superficial abrasion above the left eye.    Patient is low risk for intracranial hemorrhage per [place] CT head rule, no indication for imaging at this time.  Patient and caregiver provided with reassurance.  Stable for discharge with outpatient follow-up.    Problems Addressed:  Fall, initial encounter: acute illness or injury  Injury of head, initial encounter: acute illness or injury    Amount and/or Complexity of Data Reviewed  External Data Reviewed: notes.    Risk  OTC drugs.             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   No family history on file.   Social History     Tobacco Use    Smoking status: Never    Smokeless tobacco: Never   Vaping Use    Vaping status: Never Used   Substance Use Topics    Alcohol use: No    Drug use: Not Currently      E-Cigarette/Vaping    E-Cigarette Use Never User       E-Cigarette/Vaping Substances      I have reviewed and agree with the history as documented.     44-year-old male with history of ambulatory dysfunction secondary to chronic gait issue presents to the emergency department for evaluation after a fall.  Patient had a mechanical fall with caused him to fall to the ground striking his head.  No reported loss of consciousness.  Has been at baseline mental status since.  No blood thinners or daily aspirin use.  Patient offers no complaints at this time.        Review of Systems   Constitutional:  Negative for chills and fever.   HENT:  Negative for ear pain and sore throat.    Eyes:  Negative for pain and visual disturbance.   Respiratory:  Negative for cough and shortness of breath.    Cardiovascular:  Negative for chest pain and palpitations.   Gastrointestinal:  Negative for abdominal pain and vomiting.   Genitourinary:  Negative for dysuria and hematuria.   Musculoskeletal:  Negative for arthralgias and back pain.   Skin:  Negative for color change and rash.   Neurological:  Negative for seizures and syncope.   All other systems reviewed and are negative.          Objective       ED Triage Vitals [03/06/25 0926]   Temperature Pulse Blood Pressure Respirations SpO2 Patient Position - Orthostatic VS   97.6 °F (36.4 °C) (!) 53 126/77 18 100 % Lying      Temp src Heart Rate Source BP Location FiO2 (%) Pain Score    -- -- -- -- No Pain      Vitals      Date and Time Temp Pulse SpO2 Resp BP Pain Score FACES Pain Rating User   03/06/25 0926 97.6 °F (36.4 °C) 53 100 % 18 126/77 No Pain -- SN            Physical  Exam  Vitals and nursing note reviewed.   Constitutional:       General: He is not in acute distress.  HENT:      Head: Normocephalic.      Comments: Small hematoma with superficial abrasion above the left eye     Right Ear: External ear normal.      Left Ear: External ear normal.      Nose: Nose normal.      Mouth/Throat:      Mouth: Mucous membranes are moist.   Eyes:      Extraocular Movements: Extraocular movements intact.      Conjunctiva/sclera: Conjunctivae normal.      Pupils: Pupils are equal, round, and reactive to light.   Cardiovascular:      Rate and Rhythm: Normal rate and regular rhythm.      Pulses: Normal pulses.      Heart sounds: Normal heart sounds. No murmur heard.  Pulmonary:      Effort: Pulmonary effort is normal. No respiratory distress.      Breath sounds: No stridor.   Musculoskeletal:         General: No deformity. Normal range of motion.      Cervical back: Normal range of motion and neck supple. No tenderness.   Skin:     General: Skin is warm and dry.      Capillary Refill: Capillary refill takes less than 2 seconds.   Neurological:      General: No focal deficit present.      Mental Status: He is alert. Mental status is at baseline.   Psychiatric:         Mood and Affect: Mood normal.         Behavior: Behavior normal.         Results Reviewed       None            No orders to display       Procedures    ED Medication and Procedure Management   Prior to Admission Medications   Prescriptions Last Dose Informant Patient Reported? Taking?   CITALOPRAM HYDROBROMIDE PO   Yes No   Sig: Take 20 mg by mouth daily   Loperamide HCl (IMODIUM PO)   Yes No   Sig: Take 2 mg by mouth as needed   Multiple Vitamin (TAB-A-JUAN PO)   Yes No   Sig: Take by mouth daily   acetaminophen (TYLENOL) 500 mg tablet   Yes No   Sig: Take 500 mg by mouth every 6 (six) hours as needed   Patient not taking: Reported on 9/9/2024   brimonidine-timolol (COMBIGAN) 0.2-0.5 %   Yes No   Sig: Administer 1 drop to both eyes  every 12 (twelve) hours   brinzolamide (AZOPT) 1 % ophthalmic suspension   Yes No   Sig: Administer 1 drop to both eyes 2 (two) times a day   carbidopa-levodopa (SINEMET)  mg per tablet   No No   Sig: Take 1.5 tablets by mouth 3 (three) times a day   desmopressin (DDAVP) 0.1 mg tablet   Yes No   Sig: Take 0.1 mg by mouth daily   diphenhydrAMINE (BENADRYL) 25 mg tablet   Yes No   Sig: Take 25 mg by mouth as needed for itching   fluticasone (FLONASE) 50 mcg/act nasal spray   Yes No   Si sprays into each nostril as needed   latanoprost (XALATAN) 0.005 % ophthalmic solution   Yes No   Sig: Administer 1 drop to both eyes daily at bedtime   omeprazole (PriLOSEC) 20 mg delayed release capsule   Yes No   Sig: Take 20 mg by mouth daily   polyethylene glycol (MIRALAX) 17 g packet   Yes No   Sig: Take 17 g by mouth daily      Facility-Administered Medications: None     Discharge Medication List as of 3/6/2025  9:31 AM        CONTINUE these medications which have NOT CHANGED    Details   acetaminophen (TYLENOL) 500 mg tablet Take 500 mg by mouth every 6 (six) hours as needed, Historical Med      brimonidine-timolol (COMBIGAN) 0.2-0.5 % Administer 1 drop to both eyes every 12 (twelve) hours, Historical Med      brinzolamide (AZOPT) 1 % ophthalmic suspension Administer 1 drop to both eyes 2 (two) times a day, Historical Med      carbidopa-levodopa (SINEMET)  mg per tablet Take 1.5 tablets by mouth 3 (three) times a day, Starting 2022, No Print      CITALOPRAM HYDROBROMIDE PO Take 20 mg by mouth daily, Historical Med      desmopressin (DDAVP) 0.1 mg tablet Take 0.1 mg by mouth daily, Historical Med      diphenhydrAMINE (BENADRYL) 25 mg tablet Take 25 mg by mouth as needed for itching, Historical Med      fluticasone (FLONASE) 50 mcg/act nasal spray 2 sprays into each nostril as needed, Historical Med      latanoprost (XALATAN) 0.005 % ophthalmic solution Administer 1 drop to both eyes daily at bedtime,  Historical Med      Loperamide HCl (IMODIUM PO) Take 2 mg by mouth as needed, Historical Med      Multiple Vitamin (TAB-A-JUAN PO) Take by mouth daily, Historical Med      omeprazole (PriLOSEC) 20 mg delayed release capsule Take 20 mg by mouth daily, Historical Med      polyethylene glycol (MIRALAX) 17 g packet Take 17 g by mouth daily, Historical Med           No discharge procedures on file.  ED SEPSIS DOCUMENTATION   Time reflects when diagnosis was documented in both MDM as applicable and the Disposition within this note       Time User Action Codes Description Comment    3/6/2025  9:30 AM Arvin Grant [W19.XXXA] Fall, initial encounter     3/6/2025  9:30 AM Arvin Grant [S09.90XA] Injury of head, initial encounter                  Arvin Grant MD  03/06/25 1222